# Patient Record
Sex: MALE | Race: WHITE | NOT HISPANIC OR LATINO | Employment: OTHER | URBAN - NONMETROPOLITAN AREA
[De-identification: names, ages, dates, MRNs, and addresses within clinical notes are randomized per-mention and may not be internally consistent; named-entity substitution may affect disease eponyms.]

---

## 2021-03-12 ENCOUNTER — IMMUNIZATIONS (OUTPATIENT)
Dept: FAMILY MEDICINE CLINIC | Facility: HOSPITAL | Age: 71
End: 2021-03-12

## 2021-03-12 DIAGNOSIS — Z23 ENCOUNTER FOR IMMUNIZATION: Primary | ICD-10-CM

## 2021-03-12 PROCEDURE — 91300 SARS-COV-2 / COVID-19 MRNA VACCINE (PFIZER-BIONTECH) 30 MCG: CPT

## 2021-03-12 PROCEDURE — 0001A SARS-COV-2 / COVID-19 MRNA VACCINE (PFIZER-BIONTECH) 30 MCG: CPT

## 2021-04-08 ENCOUNTER — IMMUNIZATIONS (OUTPATIENT)
Dept: FAMILY MEDICINE CLINIC | Facility: HOSPITAL | Age: 71
End: 2021-04-08

## 2021-04-08 DIAGNOSIS — Z23 ENCOUNTER FOR IMMUNIZATION: Primary | ICD-10-CM

## 2021-04-08 PROCEDURE — 0002A SARS-COV-2 / COVID-19 MRNA VACCINE (PFIZER-BIONTECH) 30 MCG: CPT

## 2021-04-08 PROCEDURE — 91300 SARS-COV-2 / COVID-19 MRNA VACCINE (PFIZER-BIONTECH) 30 MCG: CPT

## 2021-04-20 DIAGNOSIS — M79.10 MYALGIA, UNSPECIFIED SITE: ICD-10-CM

## 2021-05-12 ENCOUNTER — APPOINTMENT (OUTPATIENT)
Dept: NUCLEAR MEDICINE | Facility: HOSPITAL | Age: 71
DRG: 305 | End: 2021-05-12
Payer: MEDICARE

## 2021-05-12 ENCOUNTER — APPOINTMENT (EMERGENCY)
Dept: RADIOLOGY | Facility: HOSPITAL | Age: 71
DRG: 305 | End: 2021-05-12
Payer: MEDICARE

## 2021-05-12 ENCOUNTER — HOSPITAL ENCOUNTER (INPATIENT)
Facility: HOSPITAL | Age: 71
LOS: 1 days | Discharge: HOME | DRG: 305 | End: 2021-05-13
Attending: EMERGENCY MEDICINE | Admitting: FAMILY MEDICINE
Payer: MEDICARE

## 2021-05-12 DIAGNOSIS — I24.9 ACS (ACUTE CORONARY SYNDROME) (HCC): Primary | ICD-10-CM

## 2021-05-12 DIAGNOSIS — I16.9 HYPERTENSIVE CRISIS: ICD-10-CM

## 2021-05-12 DIAGNOSIS — I10 UNCONTROLLED HYPERTENSION: ICD-10-CM

## 2021-05-12 DIAGNOSIS — R07.9 CHEST PAIN WITH MODERATE RISK FOR CARDIAC ETIOLOGY: ICD-10-CM

## 2021-05-12 PROBLEM — E78.2 MIXED HYPERLIPIDEMIA: Status: ACTIVE | Noted: 2021-05-12

## 2021-05-12 PROBLEM — E11.9 TYPE 2 DIABETES MELLITUS WITHOUT COMPLICATION, WITHOUT LONG-TERM CURRENT USE OF INSULIN (HCC): Status: ACTIVE | Noted: 2021-05-12

## 2021-05-12 PROBLEM — K21.00 GASTROESOPHAGEAL REFLUX DISEASE WITH ESOPHAGITIS: Status: ACTIVE | Noted: 2021-05-12

## 2021-05-12 LAB
ALBUMIN SERPL BCP-MCNC: 4.2 G/DL (ref 3.5–5)
ALP SERPL-CCNC: 60 U/L (ref 46–116)
ALT SERPL W P-5'-P-CCNC: 31 U/L (ref 12–78)
ANION GAP SERPL CALCULATED.3IONS-SCNC: 7 MMOL/L (ref 4–13)
APTT PPP: 30 SECONDS (ref 23–37)
AST SERPL W P-5'-P-CCNC: 20 U/L (ref 5–45)
BASOPHILS # BLD AUTO: 0.05 THOUSANDS/ΜL (ref 0–0.1)
BASOPHILS NFR BLD AUTO: 1 % (ref 0–1)
BILIRUB SERPL-MCNC: 0.82 MG/DL (ref 0.2–1)
BUN SERPL-MCNC: 11 MG/DL (ref 5–25)
CALCIUM SERPL-MCNC: 9.8 MG/DL (ref 8.3–10.1)
CHLORIDE SERPL-SCNC: 102 MMOL/L (ref 100–108)
CHOLEST SERPL-MCNC: 171 MG/DL (ref 50–200)
CO2 SERPL-SCNC: 32 MMOL/L (ref 21–32)
CREAT SERPL-MCNC: 0.9 MG/DL (ref 0.6–1.3)
EOSINOPHIL # BLD AUTO: 0.16 THOUSAND/ΜL (ref 0–0.61)
EOSINOPHIL NFR BLD AUTO: 2 % (ref 0–6)
ERYTHROCYTE [DISTWIDTH] IN BLOOD BY AUTOMATED COUNT: 11.9 % (ref 11.6–15.1)
GFR SERPL CREATININE-BSD FRML MDRD: 87 ML/MIN/1.73SQ M
GLUCOSE SERPL-MCNC: 105 MG/DL (ref 65–140)
GLUCOSE SERPL-MCNC: 121 MG/DL (ref 65–140)
GLUCOSE SERPL-MCNC: 96 MG/DL (ref 65–140)
HCT VFR BLD AUTO: 44.6 % (ref 36.5–49.3)
HCV AB SER QL: NORMAL
HDLC SERPL-MCNC: 49 MG/DL
HGB BLD-MCNC: 14.8 G/DL (ref 12–17)
IMM GRANULOCYTES # BLD AUTO: 0.02 THOUSAND/UL (ref 0–0.2)
IMM GRANULOCYTES NFR BLD AUTO: 0 % (ref 0–2)
INR PPP: 1.02 (ref 0.84–1.19)
LACTATE SERPL-SCNC: 1.2 MMOL/L (ref 0.5–2)
LDLC SERPL CALC-MCNC: 110 MG/DL (ref 0–100)
LIPASE SERPL-CCNC: 221 U/L (ref 73–393)
LYMPHOCYTES # BLD AUTO: 2.76 THOUSANDS/ΜL (ref 0.6–4.47)
LYMPHOCYTES NFR BLD AUTO: 36 % (ref 14–44)
MAGNESIUM SERPL-MCNC: 1.9 MG/DL (ref 1.6–2.6)
MCH RBC QN AUTO: 30.9 PG (ref 26.8–34.3)
MCHC RBC AUTO-ENTMCNC: 33.2 G/DL (ref 31.4–37.4)
MCV RBC AUTO: 93 FL (ref 82–98)
MONOCYTES # BLD AUTO: 0.74 THOUSAND/ΜL (ref 0.17–1.22)
MONOCYTES NFR BLD AUTO: 10 % (ref 4–12)
NEUTROPHILS # BLD AUTO: 3.9 THOUSANDS/ΜL (ref 1.85–7.62)
NEUTS SEG NFR BLD AUTO: 51 % (ref 43–75)
NRBC BLD AUTO-RTO: 0 /100 WBCS
PLATELET # BLD AUTO: 183 THOUSANDS/UL (ref 149–390)
PMV BLD AUTO: 11.6 FL (ref 8.9–12.7)
POTASSIUM SERPL-SCNC: 4.5 MMOL/L (ref 3.5–5.3)
PROT SERPL-MCNC: 7.9 G/DL (ref 6.4–8.2)
PROTHROMBIN TIME: 13.2 SECONDS (ref 11.6–14.5)
RBC # BLD AUTO: 4.79 MILLION/UL (ref 3.88–5.62)
SODIUM SERPL-SCNC: 141 MMOL/L (ref 136–145)
TRIGL SERPL-MCNC: 62 MG/DL
TROPONIN I SERPL-MCNC: <0.02 NG/ML
TSH SERPL DL<=0.05 MIU/L-ACNC: 1.98 UIU/ML (ref 0.36–3.74)
WBC # BLD AUTO: 7.63 THOUSAND/UL (ref 4.31–10.16)

## 2021-05-12 PROCEDURE — 85610 PROTHROMBIN TIME: CPT | Performed by: EMERGENCY MEDICINE

## 2021-05-12 PROCEDURE — 84484 ASSAY OF TROPONIN QUANT: CPT | Performed by: NURSE PRACTITIONER

## 2021-05-12 PROCEDURE — 80053 COMPREHEN METABOLIC PANEL: CPT | Performed by: EMERGENCY MEDICINE

## 2021-05-12 PROCEDURE — 36415 COLL VENOUS BLD VENIPUNCTURE: CPT | Performed by: EMERGENCY MEDICINE

## 2021-05-12 PROCEDURE — 86803 HEPATITIS C AB TEST: CPT | Performed by: NURSE PRACTITIONER

## 2021-05-12 PROCEDURE — 99220 PR INITIAL OBSERVATION CARE/DAY 70 MINUTES: CPT | Performed by: FAMILY MEDICINE

## 2021-05-12 PROCEDURE — 80061 LIPID PANEL: CPT | Performed by: NURSE PRACTITIONER

## 2021-05-12 PROCEDURE — 83605 ASSAY OF LACTIC ACID: CPT | Performed by: EMERGENCY MEDICINE

## 2021-05-12 PROCEDURE — 82948 REAGENT STRIP/BLOOD GLUCOSE: CPT

## 2021-05-12 PROCEDURE — 84484 ASSAY OF TROPONIN QUANT: CPT | Performed by: EMERGENCY MEDICINE

## 2021-05-12 PROCEDURE — 93005 ELECTROCARDIOGRAM TRACING: CPT

## 2021-05-12 PROCEDURE — 99222 1ST HOSP IP/OBS MODERATE 55: CPT | Performed by: INTERNAL MEDICINE

## 2021-05-12 PROCEDURE — 83735 ASSAY OF MAGNESIUM: CPT | Performed by: NURSE PRACTITIONER

## 2021-05-12 PROCEDURE — 83690 ASSAY OF LIPASE: CPT | Performed by: EMERGENCY MEDICINE

## 2021-05-12 PROCEDURE — 84443 ASSAY THYROID STIM HORMONE: CPT | Performed by: NURSE PRACTITIONER

## 2021-05-12 PROCEDURE — 99285 EMERGENCY DEPT VISIT HI MDM: CPT | Performed by: EMERGENCY MEDICINE

## 2021-05-12 PROCEDURE — 85730 THROMBOPLASTIN TIME PARTIAL: CPT | Performed by: EMERGENCY MEDICINE

## 2021-05-12 PROCEDURE — 85025 COMPLETE CBC W/AUTO DIFF WBC: CPT | Performed by: EMERGENCY MEDICINE

## 2021-05-12 PROCEDURE — 71045 X-RAY EXAM CHEST 1 VIEW: CPT

## 2021-05-12 PROCEDURE — 1124F ACP DISCUSS-NO DSCNMKR DOCD: CPT | Performed by: EMERGENCY MEDICINE

## 2021-05-12 PROCEDURE — 99285 EMERGENCY DEPT VISIT HI MDM: CPT

## 2021-05-12 RX ORDER — LISINOPRIL 20 MG/1
40 TABLET ORAL DAILY
Status: DISCONTINUED | OUTPATIENT
Start: 2021-05-12 | End: 2021-05-13 | Stop reason: HOSPADM

## 2021-05-12 RX ORDER — ACETAMINOPHEN 325 MG/1
650 TABLET ORAL ONCE
Status: COMPLETED | OUTPATIENT
Start: 2021-05-12 | End: 2021-05-12

## 2021-05-12 RX ORDER — AMLODIPINE BESYLATE 10 MG/1
10 TABLET ORAL DAILY
Status: DISCONTINUED | OUTPATIENT
Start: 2021-05-12 | End: 2021-05-13 | Stop reason: HOSPADM

## 2021-05-12 RX ORDER — METFORMIN HYDROCHLORIDE 500 MG/1
500 TABLET, EXTENDED RELEASE ORAL EVERY EVENING
Status: DISCONTINUED | OUTPATIENT
Start: 2021-05-12 | End: 2021-05-12

## 2021-05-12 RX ORDER — ACETAMINOPHEN 160 MG
2000 TABLET,DISINTEGRATING ORAL
COMMUNITY
Start: 2021-04-21

## 2021-05-12 RX ORDER — METFORMIN HYDROCHLORIDE 500 MG/1
500 TABLET, EXTENDED RELEASE ORAL EVERY EVENING
COMMUNITY
Start: 2021-03-07

## 2021-05-12 RX ORDER — CARVEDILOL 12.5 MG/1
12.5 TABLET ORAL 2 TIMES DAILY WITH MEALS
Status: DISCONTINUED | OUTPATIENT
Start: 2021-05-12 | End: 2021-05-13 | Stop reason: HOSPADM

## 2021-05-12 RX ORDER — ASPIRIN 81 MG/1
81 TABLET ORAL DAILY
Status: DISCONTINUED | OUTPATIENT
Start: 2021-05-13 | End: 2021-05-13 | Stop reason: HOSPADM

## 2021-05-12 RX ORDER — METOPROLOL SUCCINATE 50 MG/1
100 TABLET, EXTENDED RELEASE ORAL
Status: DISCONTINUED | OUTPATIENT
Start: 2021-05-12 | End: 2021-05-12

## 2021-05-12 RX ORDER — ASPIRIN 81 MG/1
81 TABLET, CHEWABLE ORAL DAILY
Status: DISCONTINUED | OUTPATIENT
Start: 2021-05-13 | End: 2021-05-12

## 2021-05-12 RX ORDER — LISINOPRIL 10 MG/1
20 TABLET ORAL DAILY
Status: DISCONTINUED | OUTPATIENT
Start: 2021-05-12 | End: 2021-05-12

## 2021-05-12 RX ORDER — PANTOPRAZOLE SODIUM 40 MG/1
40 TABLET, DELAYED RELEASE ORAL
Status: DISCONTINUED | OUTPATIENT
Start: 2021-05-12 | End: 2021-05-13 | Stop reason: HOSPADM

## 2021-05-12 RX ORDER — METOPROLOL SUCCINATE 100 MG/1
100 TABLET, EXTENDED RELEASE ORAL
COMMUNITY
Start: 2021-03-31 | End: 2021-05-13 | Stop reason: HOSPADM

## 2021-05-12 RX ORDER — ASPIRIN 81 MG/1
324 TABLET, CHEWABLE ORAL ONCE
Status: COMPLETED | OUTPATIENT
Start: 2021-05-12 | End: 2021-05-12

## 2021-05-12 RX ORDER — MELATONIN
2000 DAILY
Status: DISCONTINUED | OUTPATIENT
Start: 2021-05-12 | End: 2021-05-13 | Stop reason: HOSPADM

## 2021-05-12 RX ORDER — AMLODIPINE BESYLATE 5 MG/1
7.5 TABLET ORAL DAILY
COMMUNITY
Start: 2021-05-11 | End: 2021-05-13 | Stop reason: HOSPADM

## 2021-05-12 RX ORDER — LISINOPRIL 20 MG/1
20 TABLET ORAL DAILY
COMMUNITY
Start: 2021-03-31 | End: 2021-05-13 | Stop reason: HOSPADM

## 2021-05-12 RX ORDER — FLUTICASONE PROPIONATE 50 MCG
2 SPRAY, SUSPENSION (ML) NASAL DAILY
COMMUNITY

## 2021-05-12 RX ORDER — OMEPRAZOLE 20 MG/1
40 CAPSULE, DELAYED RELEASE ORAL DAILY
COMMUNITY
Start: 2021-03-17

## 2021-05-12 RX ORDER — ATORVASTATIN CALCIUM 40 MG/1
40 TABLET, FILM COATED ORAL
Status: DISCONTINUED | OUTPATIENT
Start: 2021-05-12 | End: 2021-05-13 | Stop reason: HOSPADM

## 2021-05-12 RX ORDER — ROSUVASTATIN CALCIUM 20 MG/1
20 TABLET, COATED ORAL EVERY OTHER DAY
COMMUNITY
Start: 2021-02-22 | End: 2021-07-16 | Stop reason: SINTOL

## 2021-05-12 RX ADMIN — ASPIRIN 81 MG CHEWABLE TABLET 324 MG: 81 TABLET CHEWABLE at 02:52

## 2021-05-12 RX ADMIN — Medication 2000 UNITS: at 08:41

## 2021-05-12 RX ADMIN — ACETAMINOPHEN 650 MG: 325 TABLET ORAL at 10:39

## 2021-05-12 RX ADMIN — CARVEDILOL 12.5 MG: 12.5 TABLET, FILM COATED ORAL at 17:53

## 2021-05-12 RX ADMIN — PANTOPRAZOLE SODIUM 40 MG: 40 TABLET, DELAYED RELEASE ORAL at 06:10

## 2021-05-12 RX ADMIN — ENOXAPARIN SODIUM 40 MG: 40 INJECTION SUBCUTANEOUS at 21:38

## 2021-05-12 RX ADMIN — AMLODIPINE BESYLATE 10 MG: 10 TABLET ORAL at 08:40

## 2021-05-12 RX ADMIN — ATORVASTATIN CALCIUM 40 MG: 40 TABLET, FILM COATED ORAL at 17:52

## 2021-05-12 RX ADMIN — LISINOPRIL 40 MG: 20 TABLET ORAL at 08:41

## 2021-05-12 NOTE — ASSESSMENT & PLAN NOTE
Lab Results   Component Value Date    HGBA1C 7 6 (H) 02/22/2021       No results for input(s): POCGLU in the last 72 hours      Blood Sugar Average: Last 72 hrs:     Hemoglobin A1c 7 6 in February  Outpatient follow-up  Continue metformin  Trend Accu-Cheks, sliding scale insulin coverage

## 2021-05-12 NOTE — ASSESSMENT & PLAN NOTE
· EGD on Monday revealed changes consistent with Wylie's esophagus  · Omeprazole 40 mg daily outpatient regimen  · Continue PPI- Outpatient follow-up

## 2021-05-12 NOTE — ASSESSMENT & PLAN NOTE
· POA with blood pressure 189/81  · C/O feeling hot with elevated blood pressure  · PCP increased amlodipine from 5 mg to 7 5 mg on 05/11  · Continue home regimen amlodipine 7 5 mg, lisinopril 20 mg, metoprolol succinate 100 mg daily  · Trend blood pressures

## 2021-05-12 NOTE — ASSESSMENT & PLAN NOTE
Negative troponin levels with no EKG changes  Suspect HTN etiology, will optimize medications for BP control  Plan for nuclear stress test tomorrow  Will obtain echocardiogram

## 2021-05-12 NOTE — ASSESSMENT & PLAN NOTE
Stop Metoprolol succinate  Start Carvedilol 12 5mg BID  Will obtain renal artery ultrasound and echocardiogram  Plan for nuclear stress test tomorrow - NO caffeine

## 2021-05-12 NOTE — H&P
[facility] 1950, 73 y o  male MRN: [id number]  Unit/Bed#: -01 Encounter: [id number]  Primary Care Provider: Sg Saeed MD   Date and time admitted to hospital: 5/12/2021  2:35 AM    * Chest pain with moderate risk for cardiac etiology  Assessment & Plan  · POA- awakened at 2:00 a m  With left arm pain radiating into left anterior shoulder  · ELLIOTT 1  · Risk factors-dm, HTN, hyperlipidemia  · History abnormal stress test 20 years ago with subsequent normal cardiac catheterization at [facility]  · Aspirin 324 mg given in ER  · Pain-free at time of admission  · Initial troponin and EKG nonischemic  · Plan- trend troponins, EKG, obtain stress test  · Appreciate cardiology consultation    Gastroesophageal reflux disease with esophagitis  Assessment & Plan  · EGD on Monday revealed changes consistent with Wylie's esophagus  · Omeprazole 40 mg daily outpatient regimen  · Continue PPI- Outpatient follow-up    Mixed hyperlipidemia  Assessment & Plan  · Check fasting lipid panel  · Continue high-intensity statin    Type 2 diabetes mellitus without complication, without long-term current use of insulin (HCC)  Assessment & Plan  Lab Results   Component Value Date    HGBA1C 7 6 (H) 02/22/2021       No results for input(s): POCGLU in the last 72 hours      Blood Sugar Average: Last 72 hrs:     Hemoglobin A1c 7 6 in February  Outpatient follow-up  Continue metformin  Trend Accu-Cheks, sliding scale insulin coverage    Hypertensive crisis  Assessment & Plan  · POA with blood pressure 189/81  · C/O feeling hot with elevated blood pressure  · PCP increased amlodipine from 5 mg to 7 5 mg on 05/11  · Continue home regimen amlodipine 7 5 mg, lisinopril 20 mg, metoprolol succinate 100 mg daily  · Trend blood pressures    VTE Prophylaxis: Enoxaparin (Lovenox)  / sequential compression device   Code Status:  Full  POLST: POLST form is not discussed and not completed at this time  Discussion with family:  Wife    Anticipated Length of Stay:  Patient will be admitted on an Observation basis with an anticipated length of stay of  < 2 midnights  Justification for Hospital Stay:  Arm pain, flushing    Total Time for Visit, including Counseling / Coordination of Care: 30 minutes  Greater than 50% of this total time spent on direct patient counseling and coordination of care  Chief Complaint:   Arm pain    History of Present Illness:    Ismael Sullivan is a 73 y o  male who presents with history of diabetes mellitus, hypertension with labile blood pressures and recent blood pressure medication regimen changes, hyperlipidemia, history of abnormal stress test 20 years ago with subsequent normal cardiac catheterization  Patient reports that he awakened at 2:00 a m  With severe left arm pain radiating into his left anterior shoulder, earlier in the day he reports hypertension and his PCP increased his amlodipine dosage  He reports a fairly active day where he mowed his grass with a walk behind lawn more and moved stone from his patio  He denied chest pain or dyspnea with any of these events  Recent upper endoscopy revealed Wylie's esophagus changes and his omeprazole was increased  Concern in emergency department for cardiac cause of left arm pain with significant risk factors plan for stress test     Review of Systems:    Review of Systems   Constitutional: Positive for diaphoresis  Negative for chills and fever  HENT: Negative for ear pain and sore throat  Eyes: Negative for pain and visual disturbance  Respiratory: Negative for cough and shortness of breath  Cardiovascular: Negative for chest pain and palpitations  Gastrointestinal: Negative for abdominal pain and vomiting  Genitourinary: Negative for dysuria and hematuria  Musculoskeletal: Positive for arthralgias  Negative for back pain  Skin: Negative for color change and rash     Neurological: Negative for seizures and syncope  All other systems reviewed and are negative  Past Medical and Surgical History:     Past Medical History:   Diagnosis Date    Diabetes mellitus ([facility] [address] )     High cholesterol     Hypertension        History reviewed  No pertinent surgical history  Meds/Allergies:    Prior to Admission medications    Medication Sig Start Date End Date Taking? Authorizing Provider   amLODIPine (NORVASC) 5 mg tablet Take 7 5 mg by mouth daily 5/11/21  Yes Historical Provider, MD   Cholecalciferol (Vitamin D3) 50 MCG (2000 UT) capsule Take 2,000 Units by mouth daily after lunch 4/21/21  Yes Historical Provider, MD   fluticasone (FLONASE) 50 mcg/act nasal spray 2 sprays into each nostril daily   Yes Historical Provider, MD   lisinopril (ZESTRIL) 20 mg tablet Take 20 mg by mouth daily 3/31/21  Yes Historical Provider, MD   metFORMIN (GLUCOPHAGE-XR) 500 mg 24 hr tablet Take 500 mg by mouth every evening 3/7/21  Yes Historical Provider, MD   metoprolol succinate (TOPROL-XL) 100 mg 24 hr tablet Take 100 mg by mouth daily at bedtime 3/31/21  Yes Historical Provider, MD   omeprazole (PriLOSEC) 20 mg delayed release capsule Take 40 mg by mouth daily 3/17/21  Yes Historical Provider, MD   rosuvastatin (CRESTOR) 20 MG tablet Take 20 mg by mouth every evening 2/22/21  Yes Historical Provider, MD     I have reviewed home medications with patient personally      Allergies: No Known Allergies    Social History:     Marital Status: /Civil Union   Occupation:  Retired from [employer]  Patient Pre-hospital Living Situation:  Independent  Patient Pre-hospital Level of Mobility:  Independent  Patient Pre-hospital Diet Restrictions:  Heart healthy  Substance Use History:   Social History     Substance and Sexual Activity   Alcohol Use Not Currently     Social History     Tobacco Use   Smoking Status Never Smoker   Smokeless Tobacco Never Used     Social History     Substance and Sexual Activity Drug Use Never       Family History:  Unaware of cardiac disease in first-degree family members    Physical Exam:     Vitals:   Blood Pressure: (!) 179/86 (05/12/21 0408)  Pulse: 68 (05/12/21 0408)  Temperature: 98 °F (36 7 °C) (05/12/21 0408)  Temp Source: Temporal (05/12/21 0240)  Respirations: 20 (05/12/21 0408)  Height: 5' 9" (175 3 cm) (05/12/21 0408)  Weight - Scale: 122 kg (268 lb 1 3 oz) (05/12/21 0408)  SpO2: 98 % (05/12/21 0408)    Physical Exam  Vitals signs and nursing note reviewed  Constitutional:       Appearance: Normal appearance  He is normal weight  HENT:      Head: Normocephalic and atraumatic  Eyes:      Conjunctiva/sclera: Conjunctivae normal    Neck:      Musculoskeletal: Normal range of motion and neck supple  Cardiovascular:      Rate and Rhythm: Normal rate and regular rhythm  Pulses: Normal pulses  Heart sounds: Normal heart sounds  Pulmonary:      Effort: Pulmonary effort is normal       Breath sounds: Normal breath sounds  Abdominal:      General: Abdomen is flat  Palpations: Abdomen is soft  Tenderness: There is no abdominal tenderness  Musculoskeletal: Normal range of motion  Skin:     General: Skin is warm and dry  Capillary Refill: Capillary refill takes less than 2 seconds  Neurological:      General: No focal deficit present  Mental Status: He is alert and oriented to person, place, and time  Cranial Nerves: No cranial nerve deficit  Sensory: No sensory deficit  Motor: No weakness  Coordination: Coordination normal       Gait: Gait normal    Psychiatric:         Mood and Affect: Mood is anxious  Behavior: Behavior normal        Additional Data:     Lab Results: I have personally reviewed pertinent reports        Results from last 7 days   Lab Units 05/12/21  0251   WBC Thousand/uL 7 63   HEMOGLOBIN g/dL 14 8   HEMATOCRIT % 44 6   PLATELETS Thousands/uL 183   NEUTROS PCT % 51   LYMPHS PCT % 36   MONOS PCT % 10   EOS PCT % 2     Results from last 7 days   Lab Units 05/12/21  0251   SODIUM mmol/L 141   POTASSIUM mmol/L 4 5   CHLORIDE mmol/L 102   CO2 mmol/L 32   BUN mg/dL 11   CREATININE mg/dL 0 90   ANION GAP mmol/L 7   CALCIUM mg/dL 9 8   ALBUMIN g/dL 4 2   TOTAL BILIRUBIN mg/dL 0 82   ALK PHOS U/L 60   ALT U/L 31   AST U/L 20   GLUCOSE RANDOM mg/dL 121     Results from last 7 days   Lab Units 05/12/21  0251   INR  1 02             Results from last 7 days   Lab Units 05/12/21  0251   LACTIC ACID mmol/L 1 2       Imaging: I have personally reviewed pertinent films in PACS    XR chest 1 view portable   ED Interpretation by Jana Ureña MD (05/12 0308)   NAD          EKG, Pathology, and Other Studies Reviewed on Admission:   · EKG:  Sinus rhythm, no ectopy, normal ST segments    Allscripts / Epic Records Reviewed: Yes     ** Please Note: This note has been constructed using a voice recognition system   **

## 2021-05-12 NOTE — CONSULTS
Consultation - Cardiology   Lady Marie 73 y o  male MRN: [id number]  Unit/Bed#: -01 Encounter: [id number]    Consults      Physician Requesting Consult: Antione Krishnan MD  Reason for Consult / Principal Problem: chest pain    History of Present Illness   HPI: Lady Marie is a 73y o  year old male who has a history of HTN and diabetes mellitus type 2  He presents with left shoulder pain with hot burning sensation going down the left arm  He reports an ongoing history of uncontrolled hypertension with BP elevations to 180's/100's starting back in March of this year  His blood pressure medications were adjusted several times by his PCP with no improvement  He reports episodes of "flushing" sensation in his neck and head with "beet red face and ears" usually occur in the afternoon  He does check his blood pressure during these episodes and his BP will be elevated in 180's/100's  Sugars have been very well controlled  In January of this year he modified his diet and drastically reduced sodium and carbohydrates  He has lost 20 pounds with this change  His sugars are much better controlled however his BP has not improved  He denies any episodes of chest pain and shortness of breath  He was to see his PCP yesterday and his BP medication was adjusted  He called his PCP office at 2pm when he experienced another flushing episode associated with a strong urge to urinate  He states he voided a large amount  They advised going to the ER but he declined  He continued about his day but woke at 2am with a sharp stabbing pain in the top of his left shoulder and a burning pain in his left arm  No chest pain or shortness of breath  This prompted him to come to the emergency room  At this time he denies complaint  No chest pain, shortness of breath, arm pain, or headache  No leg swelling  He admits that he does snore but has never been tested for sleep apnea    He tells me he did have an echocardiogram about 20 years ago that showed an "enlarged heart"  He also had a stress test at that time that showed "shadowing" and he underwent a cardiac cath that was clear  He denies h/o renal artery imaging  He did undergo an EGD on Monday of this week due to ongoing acid reflux, which revealed Wylie's esophagus changes  He does have some indigestion today after taking his pills this morning on an empty stomach  He unfortunately did have green tea this morning and therefore stress test is being postponed until tomorrow  Review of Systems   Constitution: Negative  HENT: Negative  Cardiovascular: Negative  Negative for chest pain, dyspnea on exertion, irregular heartbeat, near-syncope, orthopnea and palpitations  Respiratory: Negative for cough and snoring  Endocrine: Negative  Skin: Negative  Musculoskeletal: Positive for back pain  Left shoulder and arm pain   Gastrointestinal: Negative  Genitourinary: Negative  Neurological: Negative  Psychiatric/Behavioral: Negative  Historical Information   Past Medical History:   Diagnosis Date    Diabetes mellitus ([facility] [address] )     High cholesterol     Hypertension      History reviewed  No pertinent surgical history  Social History     Substance and Sexual Activity   Alcohol Use Not Currently     Social History     Substance and Sexual Activity   Drug Use Never     Social History     Tobacco Use   Smoking Status Never Smoker   Smokeless Tobacco Never Used     Family History:   Family History   Problem Relation Age of Onset    Clotting disorder Mother     Alcohol abuse Father     Hypertension Father     Coronary artery disease Sister     Hypertension Sister     Hypertension Brother     Diabetes Paternal Grandmother        Meds/Allergies   all current active meds have been reviewed       No Known Allergies    Objective   Vitals: Blood pressure (!) 176/84, pulse 74, temperature 97 8 °F (36 6 °C), resp   rate 20, height 5' 9" (1 753 m), weight 122 kg (268 lb 1 3 oz), SpO2 95 %  , Body mass index is 39 59 kg/m² ,   Orthostatic Blood Pressures      Most Recent Value   Blood Pressure  (!) 176/84 filed at 05/12/2021 1122   Patient Position - Orthostatic VS  Lying filed at 05/12/2021 0074        Systolic (82MXO), DYX:913 , Min:153 , JPV:316     Diastolic (12YLY), OXR:24, Min:63, Max:86      Intake/Output Summary (Last 24 hours) at 5/12/2021 1300  Last data filed at 5/12/2021 0854  Gross per 24 hour   Intake 240 ml   Output --   Net 240 ml       Weight (last 2 days)     Date/Time   Weight    05/12/21 04:08:59   122 (268 08)    05/12/21 0240   122 (269 18)              Invasive Devices     Peripheral Intravenous Line            Peripheral IV 05/12/21 Left Antecubital less than 1 day                  Physical Exam  Vitals signs and nursing note reviewed  Constitutional:       General: He is not in acute distress  Appearance: Normal appearance  He is obese  He is not ill-appearing, toxic-appearing or diaphoretic  HENT:      Head: Normocephalic and atraumatic  Neck:      Musculoskeletal: Normal range of motion  Vascular: No carotid bruit or JVD  Cardiovascular:      Rate and Rhythm: Normal rate and regular rhythm  No extrasystoles are present  Pulses: Normal pulses  Radial pulses are 2+ on the right side and 2+ on the left side  Dorsalis pedis pulses are 2+ on the right side and 2+ on the left side  Heart sounds: Normal heart sounds, S1 normal and S2 normal  No murmur  Pulmonary:      Effort: Pulmonary effort is normal       Breath sounds: Normal breath sounds  Abdominal:      General: Abdomen is flat  There is no distension  Palpations: Abdomen is soft  Tenderness: There is no abdominal tenderness  Musculoskeletal: Normal range of motion  Right lower leg: No edema  Left lower leg: No edema  Skin:     General: Skin is warm and dry        Capillary Refill: Capillary refill takes less than 2 seconds  Neurological:      General: No focal deficit present  Mental Status: He is alert and oriented to person, place, and time  Psychiatric:         Mood and Affect: Mood normal          Speech: Speech normal          Behavior: Behavior normal  Behavior is cooperative  Cognition and Memory: Cognition and memory normal              Laboratory Results:  Results from last 7 days   Lab Units 05/12/21  0902 05/12/21  0612 05/12/21  0251   TROPONIN I ng/mL <0 02 <0 02 <0 02       CBC with diff:   Results from last 7 days   Lab Units 05/12/21  0251   WBC Thousand/uL 7 63   HEMOGLOBIN g/dL 14 8   HEMATOCRIT % 44 6   MCV fL 93   PLATELETS Thousands/uL 183   MCH pg 30 9   MCHC g/dL 33 2   RDW % 11 9   MPV fL 11 6   NRBC AUTO /100 WBCs 0         CMP:  Results from last 7 days   Lab Units 05/12/21  0251   POTASSIUM mmol/L 4 5   CHLORIDE mmol/L 102   CO2 mmol/L 32   BUN mg/dL 11   CREATININE mg/dL 0 90   CALCIUM mg/dL 9 8   AST U/L 20   ALT U/L 31   ALK PHOS U/L 60   EGFR ml/min/1 73sq m 87         BMP:  Results from last 7 days   Lab Units 05/12/21  0251   POTASSIUM mmol/L 4 5   CHLORIDE mmol/L 102   CO2 mmol/L 32   BUN mg/dL 11   CREATININE mg/dL 0 90   CALCIUM mg/dL 9 8       BNP:  No results for input(s): BNP in the last 72 hours  Magnesium:   Results from last 7 days   Lab Units 05/12/21  0251   MAGNESIUM mg/dL 1 9       Coags:   Results from last 7 days   Lab Units 05/12/21  0251   PTT seconds 30   INR  1 02       TSH:   1 97    Hemoglobin A1C    7 6 (from 2/22/21)    Lipid Profile:   Results from last 7 days   Lab Units 05/12/21  0612   TRIGLYCERIDES mg/dL 62   HDL mg/dL 49     Lab Results   Component Value Date    LDLCALC 110 (H) 05/12/2021       Imaging: I have personally reviewed pertinent reports  Xr Chest 1 View Portable    Result Date: 5/12/2021  Narrative: CHEST INDICATION:   Chest pain   COMPARISON:  None EXAM PERFORMED/VIEWS:  XR CHEST PORTABLE FINDINGS: Cardiomediastinal silhouette appears unremarkable  The lungs are clear  No pneumothorax or pleural effusion  Osseous structures appear within normal limits for patient age  Impression: No acute cardiopulmonary disease  Workstation performed: [id number]       EKG reviewed personally: normal sinus rhythm  Telemetry reviewed personally: normal sinus rhythm, rate 60's    Assessment:  Principal Problem:    Chest pain with moderate risk for cardiac etiology  Active Problems:    Hypertensive crisis    Type 2 diabetes mellitus without complication, without long-term current use of insulin (HCC)    Mixed hyperlipidemia    Gastroesophageal reflux disease with esophagitis      Assesment/ Plan: Will adjust medications to optimize blood pressure - discontinue Metoprolol succinate and transition to Carvedilol 12 5mg BID  Discontinue chewable aspirin and switch to ASA EC 81mg for reduced GI irritation  Will obtain renal artery ultrasound along with transthoracic echocardiogram   Plan for nuclear stress test tomorrow - NO caffeine  Counseling / Coordination of Care  Total floor / unit time spent today 45 minutes  Greater than 50% of total time was spent with the patient and / or family counseling and / or coordination of care  A description of the counseling / coordination of care: spoke with Dr Michael Romano  Will optimize blood pressure and plan for stress test tomorrow          Code Status: Level 1 - Full Code

## 2021-05-12 NOTE — ED PROVIDER NOTES
History  Chief Complaint   Patient presents with    Chest Pain     Patient began having some chest tightness  Patient also has some pain in left shoulder radiating down left arm  Patient was awakened this morning within the last 1/2 hour with burning in his left shoulder  States the brain went down his left arm and developed tightness in his chest   Got slightly nauseated  No shortness of breath  No diaphoresis  No history of similar episodes  Patient does have diabetes  Patient is of high cholesterol  Patient has hypertension  States his blood pressures been running high sick lately  The symptoms seem to resolved on its own  Currently has no chest tightness  No burning sensation  Had a headache earlier today but not now  No change in speech or vision  No focal weakness or numbness  History provided by:  Patient   used: No    Chest Pain  Pain location:  L chest  Pain quality: tightness    Pain radiates to:  L shoulder and L arm  Pain radiates to the back: no    Pain severity:  Mild  Onset quality:  Sudden  Duration:  15 minutes  Timing:  Constant  Progression:  Resolved  Chronicity:  New  Context: at rest    Context: no drug use, no movement and no stress    Relieved by:  Nothing  Worsened by:  Nothing tried  Ineffective treatments:  None tried  Associated symptoms: nausea    Associated symptoms: no abdominal pain, no altered mental status, no anorexia, no anxiety, no back pain, no cough, no dizziness, no dysphagia, no fatigue, no fever, no headache, no lower extremity edema, no numbness, no palpitations, no PND, no shortness of breath and not vomiting    Risk factors: diabetes mellitus, high cholesterol, hypertension and obesity    Risk factors: no smoking        None       Past Medical History:   Diagnosis Date    Diabetes mellitus ([facility] [address] )     High cholesterol     Hypertension        History reviewed  No pertinent surgical history  History reviewed   No pertinent family history  I have reviewed and agree with the history as documented  E-Cigarette/Vaping    E-Cigarette Use Never User      E-Cigarette/Vaping Substances     Social History     Tobacco Use    Smoking status: Never Smoker    Smokeless tobacco: Never Used   Substance Use Topics    Alcohol use: Not Currently    Drug use: Never       Review of Systems   Constitutional: Negative for chills, fatigue and fever  HENT: Negative for ear pain, hearing loss, sore throat, trouble swallowing and voice change  Eyes: Negative for pain and discharge  Respiratory: Negative for cough, shortness of breath and wheezing  Cardiovascular: Positive for chest pain  Negative for palpitations and PND  Gastrointestinal: Positive for nausea  Negative for abdominal pain, anorexia, blood in stool, constipation, diarrhea and vomiting  Genitourinary: Negative for dysuria, flank pain, frequency and hematuria  Musculoskeletal: Negative for back pain, joint swelling, neck pain and neck stiffness  Skin: Negative for rash and wound  Neurological: Negative for dizziness, seizures, syncope, facial asymmetry, numbness and headaches  Psychiatric/Behavioral: Negative for hallucinations, self-injury and suicidal ideas  All other systems reviewed and are negative  Physical Exam  Physical Exam  Vitals signs and nursing note reviewed  Constitutional:       General: He is not in acute distress  Appearance: He is well-developed  HENT:      Head: Normocephalic and atraumatic  Right Ear: External ear normal       Left Ear: External ear normal    Eyes:      Conjunctiva/sclera: Conjunctivae normal       Pupils: Pupils are equal, round, and reactive to light  Neck:      Musculoskeletal: Normal range of motion and neck supple  Cardiovascular:      Rate and Rhythm: Normal rate and regular rhythm  Heart sounds: Normal heart sounds  No murmur     Pulmonary:      Effort: Pulmonary effort is normal       Breath sounds: Normal breath sounds  No wheezing or rales  Abdominal:      General: Bowel sounds are normal  There is no distension  Palpations: Abdomen is soft  Tenderness: There is no abdominal tenderness  There is no guarding or rebound  Musculoskeletal: Normal range of motion  General: No deformity  Skin:     General: Skin is warm and dry  Findings: No rash  Neurological:      General: No focal deficit present  Mental Status: He is alert and oriented to person, place, and time  Cranial Nerves: No cranial nerve deficit  Psychiatric:         Behavior: Behavior normal          Vital Signs  ED Triage Vitals [05/12/21 0240]   Temperature Pulse Respirations Blood Pressure SpO2   98 °F (36 7 °C) 83 18 (!) 189/81 95 %      Temp Source Heart Rate Source Patient Position - Orthostatic VS BP Location FiO2 (%)   Temporal Monitor Lying Right arm --      Pain Score       2           Vitals:    05/12/21 0240 05/12/21 0300   BP: (!) 189/81 153/63   Pulse: 83 68   Patient Position - Orthostatic VS: Lying          Visual Acuity      ED Medications  Medications   nitroglycerin (NITRO-BID) 2 % TD ointment 0 5 inch (0 5 inches Topical Not Given 5/12/21 0253)   aspirin chewable tablet 324 mg (324 mg Oral Given 5/12/21 0252)       Diagnostic Studies  Results Reviewed     Procedure Component Value Units Date/Time    Lactic acid [[id number]]  (Normal) Collected: 05/12/21 0251    Lab Status: Final result Specimen: Blood from Arm, Left Updated: 05/12/21 0320     LACTIC ACID 1 2 mmol/L     Narrative:      Result may be elevated if tourniquet was used during collection      Troponin I [[id number]]  (Normal) Collected: 05/12/21 0251    Lab Status: Final result Specimen: Blood from Arm, Left Updated: 05/12/21 0317     Troponin I <0 02 ng/mL     Comprehensive metabolic panel [[id number]] Collected: 05/12/21 0251    Lab Status: Final result Specimen: Blood from Arm, Left Updated: 05/12/21 0315     Sodium 141 mmol/L      Potassium 4 5 mmol/L      Chloride 102 mmol/L      CO2 32 mmol/L      ANION GAP 7 mmol/L      BUN 11 mg/dL      Creatinine 0 90 mg/dL      Glucose 121 mg/dL      Calcium 9 8 mg/dL      AST 20 U/L      ALT 31 U/L      Alkaline Phosphatase 60 U/L      Total Protein 7 9 g/dL      Albumin 4 2 g/dL      Total Bilirubin 0 82 mg/dL      eGFR 87 ml/min/1 73sq m     Narrative:      National Kidney Disease Foundation guidelines for Chronic Kidney Disease (CKD):     Stage 1 with normal or high GFR (GFR > 90 mL/min/1 73 square meters)    Stage 2 Mild CKD (GFR = 60-89 mL/min/1 73 square meters)    Stage 3A Moderate CKD (GFR = 45-59 mL/min/1 73 square meters)    Stage 3B Moderate CKD (GFR = 30-44 mL/min/1 73 square meters)    Stage 4 Severe CKD (GFR = 15-29 mL/min/1 73 square meters)    Stage 5 End Stage CKD (GFR <15 mL/min/1 73 square meters)  Note: GFR calculation is accurate only with a steady state creatinine    Lipase [[id number]]  (Normal) Collected: 05/12/21 0251    Lab Status: Final result Specimen: Blood from Arm, Left Updated: 05/12/21 0315     Lipase 221 u/L     Protime-INR [[id number]]  (Normal) Collected: 05/12/21 0251    Lab Status: Final result Specimen: Blood from Arm, Left Updated: 05/12/21 0312     Protime 13 2 seconds      INR 1 02    APTT [[id number]]  (Normal) Collected: 05/12/21 0251    Lab Status: Final result Specimen: Blood from Arm, Left Updated: 05/12/21 0312     PTT 30 seconds     CBC and differential [[id number]] Collected: 05/12/21 0251    Lab Status: Final result Specimen: Blood from Arm, Left Updated: 05/12/21 0259     WBC 7 63 Thousand/uL      RBC 4 79 Million/uL      Hemoglobin 14 8 g/dL      Hematocrit 44 6 %      MCV 93 fL      MCH 30 9 pg      MCHC 33 2 g/dL      RDW 11 9 %      MPV 11 6 fL      Platelets 261 Thousands/uL      nRBC 0 /100 WBCs      Neutrophils Relative 51 %      Immat GRANS % 0 %      Lymphocytes Relative 36 %      Monocytes Relative 10 %      Eosinophils Relative 2 %      Basophils Relative 1 %      Neutrophils Absolute 3 90 Thousands/µL      Immature Grans Absolute 0 02 Thousand/uL      Lymphocytes Absolute 2 76 Thousands/µL      Monocytes Absolute 0 74 Thousand/µL      Eosinophils Absolute 0 16 Thousand/µL      Basophils Absolute 0 05 Thousands/µL                  XR chest 1 view portable   ED Interpretation by Ria Harris MD (05/12 0308)   NAD                 Procedures  ECG 12 Lead Documentation Only    Date/Time: 5/12/2021 2:46 AM  Performed by: Ria Harris MD  Authorized by: Ria Harris MD     ECG reviewed by me, the ED Provider: yes    Patient location:  ED  Previous ECG:     Previous ECG:  Unavailable  Rate:     ECG rate:  70  Rhythm:     Rhythm: sinus rhythm    Ectopy:     Ectopy: none    QRS:     QRS axis:  Normal             ED Course             HEART Risk Score      Most Recent Value   Heart Score Risk Calculator   History  2 Filed at: 05/12/2021 0247   ECG  0 Filed at: 05/12/2021 0247   Age  2 Filed at: 05/12/2021 0247   Risk Factors  2 Filed at: 05/12/2021 0247   Troponin  0 Filed at: 05/12/2021 0247   HEART Score  6 Filed at: 05/12/2021 0247                      SBIRT 22yo+      Most Recent Value   SBIRT (25 yo +)   In order to provide better care to our patients, we are screening all of our patients for alcohol and drug use  Would it be okay to ask you these screening questions? Yes Filed at: 05/12/2021 0243   Initial Alcohol Screen: US AUDIT-C    1  How often do you have a drink containing alcohol?  0 Filed at: 05/12/2021 0243   2  How many drinks containing alcohol do you have on a typical day you are drinking? 0 Filed at: 05/12/2021 0243   3a  Male UNDER 65: How often do you have five or more drinks on one occasion? 0 Filed at: 05/12/2021 0243   3b  FEMALE Any Age, or MALE 65+: How often do you have 4 or more drinks on one occassion?   0 Filed at: 05/12/2021 0243   Audit-C Score  0 Filed at: 05/12/2021 0243   CARMELA: How many times in the past year have you    Used an illegal drug or used a prescription medication for non-medical reasons? Never Filed at: 05/12/2021 0243                    MDM  Number of Diagnoses or Management Options     Amount and/or Complexity of Data Reviewed  Clinical lab tests: reviewed  Review and summarize past medical records: yes        Disposition  Final diagnoses:   ACS (acute coronary syndrome) ([facility] [address] )     Time reflects when diagnosis was documented in both MDM as applicable and the Disposition within this note     Time User Action Codes Description Comment    5/12/2021  3:00 AM Clarisse Sher Add [I24 9] ACS (acute coronary syndrome) [facility])       ED Disposition     ED Disposition Condition Date/Time Comment    Admit Stable Wed May 12, 2021  3:22 AM Case was discussed with Basilia Dent and the patient's admission status was agreed to be  to the service of Dr Cecilie Khan Christean Boxer    None         Patient's Medications    No medications on file     No discharge procedures on file      PDMP Review     None          ED Provider  Electronically Signed by           Pramod Alexander MD  05/12/21 7929

## 2021-05-12 NOTE — ASSESSMENT & PLAN NOTE
· POA- awakened at 2:00 a m   With left arm pain radiating into left anterior shoulder  · ELLIOTT 1  · Risk factors-dm, HTN, hyperlipidemia  · History abnormal stress test 20 years ago with subsequent normal cardiac catheterization at [facility]  · Aspirin 324 mg given in ER  · Pain-free at time of admission  · Initial troponin and EKG nonischemic  · Plan- trend troponins, EKG, obtain stress test  · Appreciate cardiology consultation

## 2021-05-12 NOTE — PLAN OF CARE
Problem: Potential for Falls  Goal: Patient will remain free of falls  Description: INTERVENTIONS:  - Assess patient frequently for physical needs  -  Identify cognitive and physical deficits and behaviors that affect risk of falls    -  [place] fall precautions as indicated by assessment   - Educate patient/family on patient safety including physical limitations  - Instruct patient to call for assistance with activity based on assessment  - Modify environment to reduce risk of injury  - Consider OT/PT consult to assist with strengthening/mobility  Outcome: Progressing     Problem: PAIN - ADULT  Goal: Verbalizes/displays adequate comfort level or baseline comfort level  Description: Interventions:  - Encourage patient to monitor pain and request assistance  - Assess pain using appropriate pain scale  - Administer analgesics based on type and severity of pain and evaluate response  - Implement non-pharmacological measures as appropriate and evaluate response  - Consider cultural and social influences on pain and pain management  - Notify physician/advanced practitioner if interventions unsuccessful or patient reports new pain  Outcome: Progressing     Problem: INFECTION - ADULT  Goal: Absence or prevention of progression during hospitalization  Description: INTERVENTIONS:  - Assess and monitor for signs and symptoms of infection  - Monitor lab/diagnostic results  - Monitor all insertion sites, i e  indwelling lines, tubes, and drains  - Monitor endotracheal if appropriate and nasal secretions for changes in amount and color  - [place] appropriate cooling/warming therapies per order  - Administer medications as ordered  - Instruct and encourage patient and family to use good hand hygiene technique  - Identify and instruct in appropriate isolation precautions for identified infection/condition  Outcome: Progressing     Problem: SAFETY ADULT  Goal: Patient will remain free of falls  Description: INTERVENTIONS:  - Assess patient frequently for physical needs  -  Identify cognitive and physical deficits and behaviors that affect risk of falls    -  [place] fall precautions as indicated by assessment   - Educate patient/family on patient safety including physical limitations  - Instruct patient to call for assistance with activity based on assessment  - Modify environment to reduce risk of injury  - Consider OT/PT consult to assist with strengthening/mobility  Outcome: Progressing  Goal: Maintain or return to baseline ADL function  Description: INTERVENTIONS:  -  Assess patient's ability to carry out ADLs; assess patient's baseline for ADL function and identify physical deficits which impact ability to perform ADLs (bathing, care of mouth/teeth, toileting, grooming, dressing, etc )  - Assess/evaluate cause of self-care deficits   - Assess range of motion  - Assess patient's mobility; develop plan if impaired  - Assess patient's need for assistive devices and provide as appropriate  - Encourage maximum independence but intervene and supervise when necessary  - Involve family in performance of ADLs  - Assess for home care needs following discharge   - Consider OT consult to assist with ADL evaluation and planning for discharge  - Provide patient education as appropriate  Outcome: Progressing  Goal: Maintain or return mobility status to optimal level  Description: INTERVENTIONS:  - Assess patient's baseline mobility status (ambulation, transfers, stairs, etc )    - Identify cognitive and physical deficits and behaviors that affect mobility  - Identify mobility aids required to assist with transfers and/or ambulation (gait belt, sit-to-stand, lift, walker, cane, etc )  - [place] fall precautions as indicated by assessment  - Record patient progress and toleration of activity level on Mobility SBAR; progress patient to next Phase/Stage  - Instruct patient to call for assistance with activity based on assessment  - Consider rehabilitation consult to assist with strengthening/weightbearing, etc   Outcome: Progressing     Problem: DISCHARGE PLANNING  Goal: Discharge to home or other facility with appropriate resources  Description: INTERVENTIONS:  - Identify barriers to discharge w/patient and caregiver  - Arrange for needed discharge resources and transportation as appropriate  - Identify discharge learning needs (meds, wound care, etc )  - Arrange for interpretive services to assist at discharge as needed  - Refer to Case Management Department for coordinating discharge planning if the patient needs post-hospital services based on physician/advanced practitioner order or complex needs related to functional status, cognitive ability, or social support system  Outcome: Progressing     Problem: Knowledge Deficit  Goal: Patient/family/caregiver demonstrates understanding of disease process, treatment plan, medications, and discharge instructions  Description: Complete learning assessment and assess knowledge base    Interventions:  - Provide teaching at level of understanding  - Provide teaching via preferred learning methods  Outcome: Progressing     Problem: CARDIOVASCULAR - ADULT  Goal: Maintains optimal cardiac output and hemodynamic stability  Description: INTERVENTIONS:  - Monitor I/O, vital signs and rhythm  - Monitor for S/S and trends of decreased cardiac output  - Administer and titrate ordered vasoactive medications to optimize hemodynamic stability  - Assess quality of pulses, skin color and temperature  - Assess for signs of decreased coronary artery perfusion  - Instruct patient to report change in severity of symptoms  Outcome: Progressing  Goal: Absence of cardiac dysrhythmias or at baseline rhythm  Description: INTERVENTIONS:  - Continuous cardiac monitoring, vital signs, obtain 12 lead EKG if ordered  - Administer antiarrhythmic and heart rate control medications as ordered  - Monitor electrolytes and administer replacement therapy as ordered  Outcome: Progressing

## 2021-05-12 NOTE — PLAN OF CARE
Problem: Potential for Falls  Goal: Patient will remain free of falls  Description: INTERVENTIONS:  - Assess patient frequently for physical needs  -  Identify cognitive and physical deficits and behaviors that affect risk of falls    -  [place] fall precautions as indicated by assessment   - Educate patient/family on patient safety including physical limitations  - Instruct patient to call for assistance with activity based on assessment  - Modify environment to reduce risk of injury  - Consider OT/PT consult to assist with strengthening/mobility  Outcome: Progressing     Problem: PAIN - ADULT  Goal: Verbalizes/displays adequate comfort level or baseline comfort level  Description: Interventions:  - Encourage patient to monitor pain and request assistance  - Assess pain using appropriate pain scale  - Administer analgesics based on type and severity of pain and evaluate response  - Implement non-pharmacological measures as appropriate and evaluate response  - Consider cultural and social influences on pain and pain management  - Notify physician/advanced practitioner if interventions unsuccessful or patient reports new pain  Outcome: Progressing     Problem: INFECTION - ADULT  Goal: Absence or prevention of progression during hospitalization  Description: INTERVENTIONS:  - Assess and monitor for signs and symptoms of infection  - Monitor lab/diagnostic results  - Monitor all insertion sites, i e  indwelling lines, tubes, and drains  - Monitor endotracheal if appropriate and nasal secretions for changes in amount and color  - [place] appropriate cooling/warming therapies per order  - Administer medications as ordered  - Instruct and encourage patient and family to use good hand hygiene technique  - Identify and instruct in appropriate isolation precautions for identified infection/condition  Outcome: Progressing     Problem: SAFETY ADULT  Goal: Patient will remain free of falls  Description: INTERVENTIONS:  - Assess patient frequently for physical needs  -  Identify cognitive and physical deficits and behaviors that affect risk of falls    -  [place] fall precautions as indicated by assessment   - Educate patient/family on patient safety including physical limitations  - Instruct patient to call for assistance with activity based on assessment  - Modify environment to reduce risk of injury  - Consider OT/PT consult to assist with strengthening/mobility  Outcome: Progressing  Goal: Maintain or return to baseline ADL function  Description: INTERVENTIONS:  -  Assess patient's ability to carry out ADLs; assess patient's baseline for ADL function and identify physical deficits which impact ability to perform ADLs (bathing, care of mouth/teeth, toileting, grooming, dressing, etc )  - Assess/evaluate cause of self-care deficits   - Assess range of motion  - Assess patient's mobility; develop plan if impaired  - Assess patient's need for assistive devices and provide as appropriate  - Encourage maximum independence but intervene and supervise when necessary  - Involve family in performance of ADLs  - Assess for home care needs following discharge   - Consider OT consult to assist with ADL evaluation and planning for discharge  - Provide patient education as appropriate  Outcome: Progressing  Goal: Maintain or return mobility status to optimal level  Description: INTERVENTIONS:  - Assess patient's baseline mobility status (ambulation, transfers, stairs, etc )    - Identify cognitive and physical deficits and behaviors that affect mobility  - Identify mobility aids required to assist with transfers and/or ambulation (gait belt, sit-to-stand, lift, walker, cane, etc )  - [place] fall precautions as indicated by assessment  - Record patient progress and toleration of activity level on Mobility SBAR; progress patient to next Phase/Stage  - Instruct patient to call for assistance with activity based on assessment  - Consider rehabilitation consult to assist with strengthening/weightbearing, etc   Outcome: Progressing     Problem: DISCHARGE PLANNING  Goal: Discharge to home or other facility with appropriate resources  Description: INTERVENTIONS:  - Identify barriers to discharge w/patient and caregiver  - Arrange for needed discharge resources and transportation as appropriate  - Identify discharge learning needs (meds, wound care, etc )  - Arrange for interpretive services to assist at discharge as needed  - Refer to Case Management Department for coordinating discharge planning if the patient needs post-hospital services based on physician/advanced practitioner order or complex needs related to functional status, cognitive ability, or social support system  Outcome: Progressing     Problem: Knowledge Deficit  Goal: Patient/family/caregiver demonstrates understanding of disease process, treatment plan, medications, and discharge instructions  Description: Complete learning assessment and assess knowledge base    Interventions:  - Provide teaching at level of understanding  - Provide teaching via preferred learning methods  Outcome: Progressing     Problem: CARDIOVASCULAR - ADULT  Goal: Maintains optimal cardiac output and hemodynamic stability  Description: INTERVENTIONS:  - Monitor I/O, vital signs and rhythm  - Monitor for S/S and trends of decreased cardiac output  - Administer and titrate ordered vasoactive medications to optimize hemodynamic stability  - Assess quality of pulses, skin color and temperature  - Assess for signs of decreased coronary artery perfusion  - Instruct patient to report change in severity of symptoms  Outcome: Progressing  Goal: Absence of cardiac dysrhythmias or at baseline rhythm  Description: INTERVENTIONS:  - Continuous cardiac monitoring, vital signs, obtain 12 lead EKG if ordered  - Administer antiarrhythmic and heart rate control medications as ordered  - Monitor electrolytes and administer replacement therapy as ordered  Outcome: Progressing

## 2021-05-13 ENCOUNTER — APPOINTMENT (INPATIENT)
Dept: NON INVASIVE DIAGNOSTICS | Facility: HOSPITAL | Age: 71
DRG: 305 | End: 2021-05-13
Payer: MEDICARE

## 2021-05-13 ENCOUNTER — APPOINTMENT (OUTPATIENT)
Dept: NUCLEAR MEDICINE | Facility: HOSPITAL | Age: 71
DRG: 305 | End: 2021-05-13
Payer: MEDICARE

## 2021-05-13 ENCOUNTER — APPOINTMENT (INPATIENT)
Dept: NUCLEAR MEDICINE | Facility: HOSPITAL | Age: 71
DRG: 305 | End: 2021-05-13
Payer: MEDICARE

## 2021-05-13 ENCOUNTER — TELEPHONE (OUTPATIENT)
Dept: CARDIOLOGY CLINIC | Facility: CLINIC | Age: 71
End: 2021-05-13

## 2021-05-13 VITALS
BODY MASS INDEX: 39.71 KG/M2 | HEIGHT: 69 IN | DIASTOLIC BLOOD PRESSURE: 82 MMHG | HEART RATE: 93 BPM | OXYGEN SATURATION: 97 % | RESPIRATION RATE: 20 BRPM | TEMPERATURE: 97.8 F | SYSTOLIC BLOOD PRESSURE: 152 MMHG | WEIGHT: 268.08 LBS

## 2021-05-13 LAB
GLUCOSE SERPL-MCNC: 105 MG/DL (ref 65–140)
GLUCOSE SERPL-MCNC: 131 MG/DL (ref 65–140)

## 2021-05-13 PROCEDURE — G1004 CDSM NDSC: HCPCS

## 2021-05-13 PROCEDURE — A9502 TC99M TETROFOSMIN: HCPCS

## 2021-05-13 PROCEDURE — 82948 REAGENT STRIP/BLOOD GLUCOSE: CPT

## 2021-05-13 PROCEDURE — C8929 TTE W OR WO FOL WCON,DOPPLER: HCPCS

## 2021-05-13 PROCEDURE — 93975 VASCULAR STUDY: CPT

## 2021-05-13 PROCEDURE — 93017 CV STRESS TEST TRACING ONLY: CPT

## 2021-05-13 PROCEDURE — 93306 TTE W/DOPPLER COMPLETE: CPT | Performed by: INTERNAL MEDICINE

## 2021-05-13 PROCEDURE — 99238 HOSP IP/OBS DSCHRG MGMT 30/<: CPT | Performed by: INTERNAL MEDICINE

## 2021-05-13 PROCEDURE — 93018 CV STRESS TEST I&R ONLY: CPT | Performed by: INTERNAL MEDICINE

## 2021-05-13 PROCEDURE — 93016 CV STRESS TEST SUPVJ ONLY: CPT | Performed by: INTERNAL MEDICINE

## 2021-05-13 PROCEDURE — 78452 HT MUSCLE IMAGE SPECT MULT: CPT

## 2021-05-13 PROCEDURE — 78452 HT MUSCLE IMAGE SPECT MULT: CPT | Performed by: INTERNAL MEDICINE

## 2021-05-13 RX ORDER — ASPIRIN 81 MG/1
81 TABLET ORAL DAILY
Refills: 0
Start: 2021-05-14

## 2021-05-13 RX ORDER — CARVEDILOL 12.5 MG/1
12.5 TABLET ORAL 2 TIMES DAILY WITH MEALS
Qty: 60 TABLET | Refills: 0 | Status: SHIPPED | OUTPATIENT
Start: 2021-05-13 | End: 2021-07-16 | Stop reason: SDUPTHER

## 2021-05-13 RX ORDER — AMLODIPINE BESYLATE 10 MG/1
10 TABLET ORAL DAILY
Qty: 30 TABLET | Refills: 0 | Status: SHIPPED | OUTPATIENT
Start: 2021-05-14 | End: 2021-05-19 | Stop reason: SDUPTHER

## 2021-05-13 RX ORDER — LISINOPRIL 40 MG/1
40 TABLET ORAL DAILY
Qty: 30 TABLET | Refills: 0 | Status: SHIPPED | OUTPATIENT
Start: 2021-05-14 | End: 2021-05-20 | Stop reason: SDUPTHER

## 2021-05-13 RX ADMIN — REGADENOSON 0.4 MG: 0.08 INJECTION, SOLUTION INTRAVENOUS at 09:45

## 2021-05-13 RX ADMIN — Medication 2000 UNITS: at 08:05

## 2021-05-13 RX ADMIN — PERFLUTREN 0.6 ML/MIN: 6.52 INJECTION, SUSPENSION INTRAVENOUS at 10:48

## 2021-05-13 RX ADMIN — LISINOPRIL 40 MG: 20 TABLET ORAL at 08:05

## 2021-05-13 RX ADMIN — ASPIRIN 81 MG: 81 TABLET, COATED ORAL at 08:05

## 2021-05-13 RX ADMIN — PANTOPRAZOLE SODIUM 40 MG: 40 TABLET, DELAYED RELEASE ORAL at 05:30

## 2021-05-13 RX ADMIN — CARVEDILOL 12.5 MG: 12.5 TABLET, FILM COATED ORAL at 14:21

## 2021-05-13 NOTE — ASSESSMENT & PLAN NOTE
Lab Results   Component Value Date    HGBA1C 7 6 (H) 02/22/2021       Recent Labs     05/12/21  1545 05/12/21  2047 05/13/21  0744 05/13/21  1113   POCGLU 105 105 105 131       Blood Sugar Average: Last 72 hrs:  (P) 551 [id number]   Hemoglobin A1c 7 6 in February  Restart outpatient medications on discharge

## 2021-05-13 NOTE — PLAN OF CARE
Problem: Potential for Falls  Goal: Patient will remain free of falls  Description: INTERVENTIONS:  - Assess patient frequently for physical needs  -  Identify cognitive and physical deficits and behaviors that affect risk of falls    -  [place] fall precautions as indicated by assessment   - Educate patient/family on patient safety including physical limitations  - Instruct patient to call for assistance with activity based on assessment  - Modify environment to reduce risk of injury  - Consider OT/PT consult to assist with strengthening/mobility  Outcome: Progressing     Problem: PAIN - ADULT  Goal: Verbalizes/displays adequate comfort level or baseline comfort level  Description: Interventions:  - Encourage patient to monitor pain and request assistance  - Assess pain using appropriate pain scale  - Administer analgesics based on type and severity of pain and evaluate response  - Implement non-pharmacological measures as appropriate and evaluate response  - Consider cultural and social influences on pain and pain management  - Notify physician/advanced practitioner if interventions unsuccessful or patient reports new pain  Outcome: Progressing     Problem: INFECTION - ADULT  Goal: Absence or prevention of progression during hospitalization  Description: INTERVENTIONS:  - Assess and monitor for signs and symptoms of infection  - Monitor lab/diagnostic results  - Monitor all insertion sites, i e  indwelling lines, tubes, and drains  - Monitor endotracheal if appropriate and nasal secretions for changes in amount and color  - [place] appropriate cooling/warming therapies per order  - Administer medications as ordered  - Instruct and encourage patient and family to use good hand hygiene technique  - Identify and instruct in appropriate isolation precautions for identified infection/condition  Outcome: Progressing     Problem: SAFETY ADULT  Goal: Patient will remain free of falls  Description: INTERVENTIONS:  - Assess patient frequently for physical needs  -  Identify cognitive and physical deficits and behaviors that affect risk of falls    -  [place] fall precautions as indicated by assessment   - Educate patient/family on patient safety including physical limitations  - Instruct patient to call for assistance with activity based on assessment  - Modify environment to reduce risk of injury  - Consider OT/PT consult to assist with strengthening/mobility  Outcome: Progressing  Goal: Maintain or return to baseline ADL function  Description: INTERVENTIONS:  -  Assess patient's ability to carry out ADLs; assess patient's baseline for ADL function and identify physical deficits which impact ability to perform ADLs (bathing, care of mouth/teeth, toileting, grooming, dressing, etc )  - Assess/evaluate cause of self-care deficits   - Assess range of motion  - Assess patient's mobility; develop plan if impaired  - Assess patient's need for assistive devices and provide as appropriate  - Encourage maximum independence but intervene and supervise when necessary  - Involve family in performance of ADLs  - Assess for home care needs following discharge   - Consider OT consult to assist with ADL evaluation and planning for discharge  - Provide patient education as appropriate  Outcome: Progressing  Goal: Maintain or return mobility status to optimal level  Description: INTERVENTIONS:  - Assess patient's baseline mobility status (ambulation, transfers, stairs, etc )    - Identify cognitive and physical deficits and behaviors that affect mobility  - Identify mobility aids required to assist with transfers and/or ambulation (gait belt, sit-to-stand, lift, walker, cane, etc )  - [place] fall precautions as indicated by assessment  - Record patient progress and toleration of activity level on Mobility SBAR; progress patient to next Phase/Stage  - Instruct patient to call for assistance with activity based on assessment  - Consider rehabilitation consult to assist with strengthening/weightbearing, etc   Outcome: Progressing     Problem: DISCHARGE PLANNING  Goal: Discharge to home or other facility with appropriate resources  Description: INTERVENTIONS:  - Identify barriers to discharge w/patient and caregiver  - Arrange for needed discharge resources and transportation as appropriate  - Identify discharge learning needs (meds, wound care, etc )  - Arrange for interpretive services to assist at discharge as needed  - Refer to Case Management Department for coordinating discharge planning if the patient needs post-hospital services based on physician/advanced practitioner order or complex needs related to functional status, cognitive ability, or social support system  Outcome: Progressing     Problem: Knowledge Deficit  Goal: Patient/family/caregiver demonstrates understanding of disease process, treatment plan, medications, and discharge instructions  Description: Complete learning assessment and assess knowledge base    Interventions:  - Provide teaching at level of understanding  - Provide teaching via preferred learning methods  Outcome: Progressing     Problem: CARDIOVASCULAR - ADULT  Goal: Maintains optimal cardiac output and hemodynamic stability  Description: INTERVENTIONS:  - Monitor I/O, vital signs and rhythm  - Monitor for S/S and trends of decreased cardiac output  - Administer and titrate ordered vasoactive medications to optimize hemodynamic stability  - Assess quality of pulses, skin color and temperature  - Assess for signs of decreased coronary artery perfusion  - Instruct patient to report change in severity of symptoms  Outcome: Progressing  Goal: Absence of cardiac dysrhythmias or at baseline rhythm  Description: INTERVENTIONS:  - Continuous cardiac monitoring, vital signs, obtain 12 lead EKG if ordered  - Administer antiarrhythmic and heart rate control medications as ordered  - Monitor electrolytes and administer replacement therapy as ordered  Outcome: Progressing

## 2021-05-13 NOTE — ASSESSMENT & PLAN NOTE
· Medication change in patient length next discharged home on amlodipine 10 mg daily lisinopril 40 mg daily and Coreg 12 5 mg b i d   · Outpatient follow-up PCP in 1 week

## 2021-05-13 NOTE — PLAN OF CARE
Problem: Potential for Falls  Goal: Patient will remain free of falls  Description: INTERVENTIONS:  - Assess patient frequently for physical needs  -  Identify cognitive and physical deficits and behaviors that affect risk of falls    -  [place] fall precautions as indicated by assessment   - Educate patient/family on patient safety including physical limitations  - Instruct patient to call for assistance with activity based on assessment  - Modify environment to reduce risk of injury  - Consider OT/PT consult to assist with strengthening/mobility  Outcome: Progressing     Problem: PAIN - ADULT  Goal: Verbalizes/displays adequate comfort level or baseline comfort level  Description: Interventions:  - Encourage patient to monitor pain and request assistance  - Assess pain using appropriate pain scale  - Administer analgesics based on type and severity of pain and evaluate response  - Implement non-pharmacological measures as appropriate and evaluate response  - Consider cultural and social influences on pain and pain management  - Notify physician/advanced practitioner if interventions unsuccessful or patient reports new pain  Outcome: Progressing     Problem: INFECTION - ADULT  Goal: Absence or prevention of progression during hospitalization  Description: INTERVENTIONS:  - Assess and monitor for signs and symptoms of infection  - Monitor lab/diagnostic results  - Monitor all insertion sites, i e  indwelling lines, tubes, and drains  - Monitor endotracheal if appropriate and nasal secretions for changes in amount and color  - [place] appropriate cooling/warming therapies per order  - Administer medications as ordered  - Instruct and encourage patient and family to use good hand hygiene technique  - Identify and instruct in appropriate isolation precautions for identified infection/condition  Outcome: Progressing     Problem: SAFETY ADULT  Goal: Patient will remain free of falls  Description: INTERVENTIONS:  - Assess patient frequently for physical needs  -  Identify cognitive and physical deficits and behaviors that affect risk of falls    -  [place] fall precautions as indicated by assessment   - Educate patient/family on patient safety including physical limitations  - Instruct patient to call for assistance with activity based on assessment  - Modify environment to reduce risk of injury  - Consider OT/PT consult to assist with strengthening/mobility  Outcome: Progressing  Goal: Maintain or return to baseline ADL function  Description: INTERVENTIONS:  -  Assess patient's ability to carry out ADLs; assess patient's baseline for ADL function and identify physical deficits which impact ability to perform ADLs (bathing, care of mouth/teeth, toileting, grooming, dressing, etc )  - Assess/evaluate cause of self-care deficits   - Assess range of motion  - Assess patient's mobility; develop plan if impaired  - Assess patient's need for assistive devices and provide as appropriate  - Encourage maximum independence but intervene and supervise when necessary  - Involve family in performance of ADLs  - Assess for home care needs following discharge   - Consider OT consult to assist with ADL evaluation and planning for discharge  - Provide patient education as appropriate  Outcome: Progressing  Goal: Maintain or return mobility status to optimal level  Description: INTERVENTIONS:  - Assess patient's baseline mobility status (ambulation, transfers, stairs, etc )    - Identify cognitive and physical deficits and behaviors that affect mobility  - Identify mobility aids required to assist with transfers and/or ambulation (gait belt, sit-to-stand, lift, walker, cane, etc )  - [place] fall precautions as indicated by assessment  - Record patient progress and toleration of activity level on Mobility SBAR; progress patient to next Phase/Stage  - Instruct patient to call for assistance with activity based on assessment  - Consider rehabilitation consult to assist with strengthening/weightbearing, etc   Outcome: Progressing     Problem: DISCHARGE PLANNING  Goal: Discharge to home or other facility with appropriate resources  Description: INTERVENTIONS:  - Identify barriers to discharge w/patient and caregiver  - Arrange for needed discharge resources and transportation as appropriate  - Identify discharge learning needs (meds, wound care, etc )  - Arrange for interpretive services to assist at discharge as needed  - Refer to Case Management Department for coordinating discharge planning if the patient needs post-hospital services based on physician/advanced practitioner order or complex needs related to functional status, cognitive ability, or social support system  Outcome: Progressing     Problem: Knowledge Deficit  Goal: Patient/family/caregiver demonstrates understanding of disease process, treatment plan, medications, and discharge instructions  Description: Complete learning assessment and assess knowledge base    Interventions:  - Provide teaching at level of understanding  - Provide teaching via preferred learning methods  Outcome: Progressing     Problem: CARDIOVASCULAR - ADULT  Goal: Maintains optimal cardiac output and hemodynamic stability  Description: INTERVENTIONS:  - Monitor I/O, vital signs and rhythm  - Monitor for S/S and trends of decreased cardiac output  - Administer and titrate ordered vasoactive medications to optimize hemodynamic stability  - Assess quality of pulses, skin color and temperature  - Assess for signs of decreased coronary artery perfusion  - Instruct patient to report change in severity of symptoms  Outcome: Progressing  Goal: Absence of cardiac dysrhythmias or at baseline rhythm  Description: INTERVENTIONS:  - Continuous cardiac monitoring, vital signs, obtain 12 lead EKG if ordered  - Administer antiarrhythmic and heart rate control medications as ordered  - Monitor electrolytes and administer replacement therapy as ordered  Outcome: Progressing

## 2021-05-13 NOTE — DISCHARGE SUMMARY
[address]  Discharge summary - Jodi Began 1950, 73 y o  male MRN: [id number]  Unit/Bed#: -01 Encounter: [id number]  Primary Care Provider: Sofia Da Silva MD   Date and time admitted to hospital: 5/12/2021  2:35 AM     * Chest pain with moderate risk for cardiac etiology  Assessment & Plan  · Stress test negative  · Discharge home with outpatient follow-up     Hypertensive crisis  Assessment & Plan  · Medication change in patient length next discharged home on amlodipine 10 mg daily lisinopril 40 mg daily and Coreg 12 5 mg b i d   · Outpatient follow-up PCP in 1 week     Type 2 diabetes mellitus without complication, without long-term current use of insulin [facility])  Assessment & Plan        Lab Results   Component Value Date     HGBA1C 7 6 (H) 02/22/2021                Recent Labs     05/12/21  1545 05/12/21  2047 05/13/21  0744 05/13/21  1113   POCGLU 105 105 105 131         Blood Sugar Average: Last 72 hrs:  (P) 220 [id number]   Hemoglobin A1c 7 6 in February  Restart outpatient medications on discharge     Gastroesophageal reflux disease with esophagitis  Assessment & Plan  · EGD on Monday revealed changes consistent with Wylie's esophagus  · Omeprazole 40 mg daily outpatient regimen  · Continue PPI- Outpatient follow-up     Mixed hyperlipidemia  Assessment & Plan  · Continue outpatient Crestor             Resolved Problems  Date Reviewed: 5/13/2021     None          Discharging Physician / Practitioner: Kristen Godfrey MD  PCP: Sofia Da Silva MD  Admission Date:       Admission Orders (From admission, onward)              Ordered          05/12/21 1206   Inpatient Admission  Once           05/12/21 0323   Place in Observation  Once                       Discharge Date: 05/13/21     Consultations During Hospital Stay:  · None     Procedures Performed:   · Clindamycin stress test no evidence of ischemic changes  · Echocardiogram ejection fraction 55% with grade 1 diastolic dysfunction  · Renal artery duplex no evidence of renal artery stenosis bilaterally     Significant Findings / Test Results:   · None     Incidental Findings:   · None      Test Results Pending at Discharge (will require follow up): · None     Outpatient Tests Requested:  · None     Complications:  None     Reason for Admission:  Chest pain     Hospital Course:   Catherine Saleh is a 73 y o  male patient who originally presented to the hospital on 5/12/2021 due to elevated blood pressure  Patient reports he is having difficulty controlling his blood pressure home  Will complete pneumonia with an elevated blood pressure greater than 180 with some flushing and head pain  As well as chest tightness and left arm burning  Came to the hospital was admitted to rule out acute coronary syndrome which was negative was noted to be hypertensive on admission  Medications were changed after consultation with Cardiology  Underwent the above testing which is negative to be discharged home with improved blood pressure and 150s  Patient advised follow-up with PCP next week for blood pressure management        Please see above list of diagnoses and related plan for additional information       Condition at Discharge: good     Discharge Day Visit / Exam:   Subjective:  Review of systems negative  Vitals: Blood Pressure: 152/82 (05/13/21 1230)  Pulse: 90 (05/13/21 1230)  Temperature: 97 8 °F (36 6 °C) (05/13/21 1110)  Temp Source: Oral (05/12/21 2223)  Respirations: 20 (05/13/21 1110)  Height: 5' 9" (175 3 cm) (05/12/21 0408)  Weight - Scale: 122 kg (268 lb 1 3 oz) (05/12/21 0408)  SpO2: 97 % (05/13/21 1230)  Exam:   Physical Exam  Constitutional:       General: He is not in acute distress  Appearance: He is not diaphoretic  Cardiovascular:      Rate and Rhythm: Normal rate and regular rhythm  Heart sounds: No murmur  No friction rub  No gallop      Pulmonary:      Effort: No respiratory distress  Breath sounds: Normal breath sounds  No wheezing, rhonchi or rales  Abdominal:      General: There is no distension  Tenderness: There is no abdominal tenderness  There is no guarding or rebound  Skin:     General: Skin is warm and dry  Neurological:      Mental Status: He is oriented to person, place, and time           Discussion with Family: Patient declined call to        Discharge instructions/Information to patient and family:   See after visit summary for information provided to patient and family        Provisions for Follow-Up Care:  See after visit summary for information related to follow-up care and any pertinent home health orders  Disposition:   Home     Planned Readmission: no     Discharge Statement:  I spent 30 minutes discharging the patient  This time was spent on the day of discharge  I had direct contact with the patient on the day of discharge  Greater than 50% of the total time was spent examining patient, answering all patient questions, arranging and discussing plan of care with patient as well as directly providing post-discharge instructions  Additional time then spent on discharge activities  Please note patient was admitted for suspicion of acute coronary syndrome requiring aggressive care and hypertensive urgency he meeting dramatic and unexpected improvement in his symptoms and care    Patient be discharged sooner than 2 midnights despite all medical probability he would require greater than 2 midnight stay on admission     Discharge Medications:  See after visit summary for reconciled discharge medications provided to patient and/or family        **Please Note: This note may have been constructed using a voice recognition system**

## 2021-05-13 NOTE — PROGRESS NOTES
[address]  Progress Note - Verner Lute 1950, 73 y o  male MRN: [id number]  Unit/Bed#: -[facility] Encounter: [id number]  Primary Care Provider: Dolores Faustin MD   Date and time admitted to hospital: 5/12/2021  2:35 AM    * Chest pain with moderate risk for cardiac etiology  Assessment & Plan  · Stress test negative  · Discharge home with outpatient follow-up    Hypertensive crisis  Assessment & Plan  · Medication change in patient length next discharged home on amlodipine 10 mg daily lisinopril 40 mg daily and Coreg 12 5 mg b i d   · Outpatient follow-up PCP in 1 week    Type 2 diabetes mellitus without complication, without long-term current use of insulin [facility])  Assessment & Plan  Lab Results   Component Value Date    HGBA1C 7 6 (H) 02/22/2021       Recent Labs     05/12/21  1545 05/12/21  2047 05/13/21  0744 05/13/21  1113   POCGLU 105 105 105 131       Blood Sugar Average: Last 72 hrs:  (P) 252 [id number]   Hemoglobin A1c 7 6 in February  Restart outpatient medications on discharge    Gastroesophageal reflux disease with esophagitis  Assessment & Plan  · EGD on Monday revealed changes consistent with Wylie's esophagus  · Omeprazole 40 mg daily outpatient regimen  · Continue PPI- Outpatient follow-up    Mixed hyperlipidemia  Assessment & Plan  · Continue outpatient Crestor      Resolved Problems  Date Reviewed: 5/13/2021    None        Discharging Physician / Practitioner: Lauren Flores MD  PCP: Dolores Faustin MD  Admission Date:   Admission Orders (From admission, onward)     Ordered        05/12/21 1206  Inpatient Admission  Once         05/12/21 0323  Place in Observation  Once                   Discharge Date: 05/13/21    Consultations During Hospital Stay:  · None    Procedures Performed:   · Clindamycin stress test no evidence of ischemic changes  · Echocardiogram ejection fraction 55% with grade 1 diastolic dysfunction  · Renal artery duplex no evidence of renal artery stenosis bilaterally    Significant Findings / Test Results:   · None    Incidental Findings:   · None     Test Results Pending at Discharge (will require follow up): · None     Outpatient Tests Requested:  · None    Complications:  None    Reason for Admission:  Chest pain    Hospital Course:   Olvin Santos is a 73 y o  male patient who originally presented to the hospital on 5/12/2021 due to elevated blood pressure  Patient reports he is having difficulty controlling his blood pressure home  Will complete pneumonia with an elevated blood pressure greater than 180 with some flushing and head pain  As well as chest tightness and left arm burning  Came to the hospital was admitted to rule out acute coronary syndrome which was negative was noted to be hypertensive on admission  Medications were changed after consultation with Cardiology  Underwent the above testing which is negative to be discharged home with improved blood pressure and 150s  Patient advised follow-up with PCP next week for blood pressure management      Please see above list of diagnoses and related plan for additional information  Condition at Discharge: good    Discharge Day Visit / Exam:   Subjective:  Review of systems negative  Vitals: Blood Pressure: 152/82 (05/13/21 1230)  Pulse: 90 (05/13/21 1230)  Temperature: 97 8 °F (36 6 °C) (05/13/21 1110)  Temp Source: Oral (05/12/21 2223)  Respirations: 20 (05/13/21 1110)  Height: 5' 9" (175 3 cm) (05/12/21 0408)  Weight - Scale: 122 kg (268 lb 1 3 oz) (05/12/21 0408)  SpO2: 97 % (05/13/21 1230)  Exam:   Physical Exam  Constitutional:       General: He is not in acute distress  Appearance: He is not diaphoretic  Cardiovascular:      Rate and Rhythm: Normal rate and regular rhythm  Heart sounds: No murmur  No friction rub  No gallop  Pulmonary:      Effort: No respiratory distress  Breath sounds: Normal breath sounds  No wheezing, rhonchi or rales  Abdominal:      General: There is no distension  Tenderness: There is no abdominal tenderness  There is no guarding or rebound  Skin:     General: Skin is warm and dry  Neurological:      Mental Status: He is oriented to person, place, and time  Discussion with Family: Patient declined call to   Discharge instructions/Information to patient and family:   See after visit summary for information provided to patient and family  Provisions for Follow-Up Care:  See after visit summary for information related to follow-up care and any pertinent home health orders  Disposition:   Home    Planned Readmission: no     Discharge Statement:  I spent 30 minutes discharging the patient  This time was spent on the day of discharge  I had direct contact with the patient on the day of discharge  Greater than 50% of the total time was spent examining patient, answering all patient questions, arranging and discussing plan of care with patient as well as directly providing post-discharge instructions  Additional time then spent on discharge activities  Discharge Medications:  See after visit summary for reconciled discharge medications provided to patient and/or family        **Please Note: This note may have been constructed using a voice recognition system**

## 2021-05-13 NOTE — NURSING NOTE
IV site d/c'd  AVS reviewed with pt at bedside  Medications to be picked up at pharmacy  F/u with PCP and cardiology  Verbalized understanding  No further questions or concerns

## 2021-05-14 PROCEDURE — 93975 VASCULAR STUDY: CPT | Performed by: SURGERY

## 2021-05-16 LAB
ATRIAL RATE: 63 BPM
ATRIAL RATE: 69 BPM
ATRIAL RATE: 75 BPM
P AXIS: 52 DEGREES
P AXIS: 56 DEGREES
P AXIS: 67 DEGREES
PR INTERVAL: 192 MS
PR INTERVAL: 194 MS
PR INTERVAL: 196 MS
QRS AXIS: 31 DEGREES
QRS AXIS: 31 DEGREES
QRS AXIS: 43 DEGREES
QRSD INTERVAL: 78 MS
QRSD INTERVAL: 80 MS
QRSD INTERVAL: 86 MS
QT INTERVAL: 400 MS
QT INTERVAL: 420 MS
QT INTERVAL: 434 MS
QTC INTERVAL: 444 MS
QTC INTERVAL: 446 MS
QTC INTERVAL: 450 MS
T WAVE AXIS: 1 DEGREES
T WAVE AXIS: 4 DEGREES
T WAVE AXIS: 41 DEGREES
VENTRICULAR RATE: 63 BPM
VENTRICULAR RATE: 69 BPM
VENTRICULAR RATE: 75 BPM

## 2021-05-16 PROCEDURE — 93010 ELECTROCARDIOGRAM REPORT: CPT | Performed by: INTERNAL MEDICINE

## 2021-05-17 LAB
ARRHY DURING EX: NORMAL
CHEST PAIN STATEMENT: NORMAL
MAX DIASTOLIC BP: 67 MMHG
MAX HEART RATE: 115 BPM
MAX PREDICTED HEART RATE: 151 BPM
MAX. SYSTOLIC BP: 156 MMHG
PROTOCOL NAME: NORMAL
REASON FOR TERMINATION: NORMAL
TARGET HR FORMULA: NORMAL
TEST INDICATION: NORMAL
TIME IN EXERCISE PHASE: NORMAL

## 2021-05-19 ENCOUNTER — TELEPHONE (OUTPATIENT)
Dept: CARDIOLOGY CLINIC | Facility: CLINIC | Age: 71
End: 2021-05-19

## 2021-05-19 ENCOUNTER — OFFICE VISIT (OUTPATIENT)
Dept: CARDIOLOGY CLINIC | Facility: CLINIC | Age: 71
End: 2021-05-19
Payer: MEDICARE

## 2021-05-19 VITALS
WEIGHT: 260 LBS | HEART RATE: 80 BPM | HEIGHT: 69 IN | SYSTOLIC BLOOD PRESSURE: 140 MMHG | BODY MASS INDEX: 38.51 KG/M2 | OXYGEN SATURATION: 96 % | DIASTOLIC BLOOD PRESSURE: 80 MMHG

## 2021-05-19 DIAGNOSIS — E78.2 MIXED HYPERLIPIDEMIA: ICD-10-CM

## 2021-05-19 DIAGNOSIS — I16.9 HYPERTENSIVE CRISIS: Primary | ICD-10-CM

## 2021-05-19 DIAGNOSIS — I10 ESSENTIAL HYPERTENSION: ICD-10-CM

## 2021-05-19 DIAGNOSIS — R07.9 CHEST PAIN WITH MODERATE RISK FOR CARDIAC ETIOLOGY: ICD-10-CM

## 2021-05-19 PROCEDURE — 99214 OFFICE O/P EST MOD 30 MIN: CPT | Performed by: NURSE PRACTITIONER

## 2021-05-19 RX ORDER — AMLODIPINE BESYLATE 5 MG/1
5 TABLET ORAL DAILY
Qty: 30 TABLET | Refills: 11 | Status: SHIPPED | OUTPATIENT
Start: 2021-05-19 | End: 2021-07-16 | Stop reason: SDUPTHER

## 2021-05-19 RX ORDER — SACCHAROMYCES BOULARDII 250 MG
250 CAPSULE ORAL DAILY
COMMUNITY
End: 2021-07-10

## 2021-05-19 RX ORDER — BUSPIRONE HYDROCHLORIDE 5 MG/1
5 TABLET ORAL DAILY
COMMUNITY

## 2021-05-19 NOTE — ASSESSMENT & PLAN NOTE
Lipids 5/13/21: , TG 62, HDL 49,   Newly started Rosuvastatin 20mg daily  Goal < 100  Statin managed by PCP

## 2021-05-19 NOTE — PHYSICIAN ADVISOR
Current patient class: Inpatient  The patient is currently on Hospital Day: 2 at / [facility]      The patient was admitted to the hospital  on 5/12/21 at 1206 for the following diagnosis:  Chest pain [R07 9]  ACS (acute coronary syndrome) ([facility] [address] ) [I24 9]  Chest pain with moderate risk for cardiac etiology [R07 9]     After review of the relevant documentation, labs, vital signs and test results, this is a PROVIDER LIABLE case  In this particular case the patient was admitted to the hospital as an inpatient  The patient however failed to satisfy the 2 midnight benchmark and closer scrutiny of the case was warranted  After review of the patient presentation and relevant labs the patient was most appropriate for observation or outpatient class at the time of admission  Given that this patient has already been discharged prior to this review they become a provider liable case  Rationale is as follows: The patient presented with severe left arm pain  And there was concern for a cardiac cause given significant risk factors  The admission note indicates plan for observation with less than two midnight stay  Blood pressure was elevated  He was eventually converted to an inpatient admission however remained in the hospital only for one midnight  He was ruled out for acute coronary syndrome and blood pressure responded to medication changes  Observation class was appropriate given the diagnosis and length of stay  The patients vitals on arrival were   ED Triage Vitals [05/12/21 0240]   Temperature Pulse Respirations Blood Pressure SpO2   98 °F (36 7 °C) 83 18 (!) 189/81 95 %      Temp Source Heart Rate Source Patient Position - Orthostatic VS BP Location FiO2 (%)   Temporal Monitor Lying Right arm --      Pain Score       2           Past Medical History:   Diagnosis Date    [facility]     High cholesterol     Hypertension      History reviewed   No pertinent surgical history  Consults have been placed to:   IP CONSULT TO CARDIOLOGY    Vitals:    05/13/21 1110 05/13/21 1119 05/13/21 1230 05/13/21 1421   BP: 161/97 151/82 152/82    BP Location:  Left arm     Pulse: 82 87 90 93   Resp: 20      Temp: 97 8 °F (36 6 °C)      TempSrc:       SpO2: 97% 96% 97%    Weight:       Height:           Most recent labs:    No results for input(s): WBC, HGB, HCT, PLT, K, NA, CALCIUM, BUN, CREATININE, LIPASE, AMYLASE, INR, TROPONINI, CKTOTAL, AST, ALT, ALKPHOS, BILITOT in the last 72 hours  Scheduled Meds:  Continuous Infusions:No current facility-administered medications for this encounter  PRN Meds:      Surgical procedures (if appropriate):

## 2021-05-19 NOTE — ASSESSMENT & PLAN NOTE
Pt was experiencing left sided chest pain in association with hypertensive urgency  BP improving and chest pain is resolved    Reviewed cardiac risks - will continue to work on weight loss  Continue Asa 81mg daily, Rosuvastatin 20mg daily

## 2021-05-19 NOTE — PATIENT INSTRUCTIONS
Check blood pressure daily 2 hours after taking medication  If BP < 110/60, HR < 55 call    OK to decrease Amlodipine to 5mg and increase Coreg to 25mg twice daily  Call with any issues

## 2021-05-19 NOTE — ASSESSMENT & PLAN NOTE
BP steadily improving but still above goal  Discussed my concern for potential sleep apnea and the implications of untreated sleep apnea on his health - he states he will consider a sleep study    Has mild lower leg edema  Will trial reducing Norvasc to 5mg and increase Carvedilol to 25mg BID  Given parameters to call with any concerns  Has BP check with PCP office next week  Follow up with me in 4-6 weeks or sooner PRN

## 2021-05-19 NOTE — PROGRESS NOTES
Cardiology Follow Up    Zeb Meyer  1950  [id number]  [facility] [address]  [address]  [phone]    1  Hypertensive crisis  Assessment & Plan:  BP steadily improving but still above goal  Discussed my concern for potential sleep apnea and the implications of untreated sleep apnea on his health - he states he will consider a sleep study  Has mild lower leg edema  Will trial reducing Norvasc to 5mg and increase Carvedilol to 25mg BID  Given parameters to call with any concerns  Has BP check with PCP office next week  Follow up with me in 4-6 weeks or sooner PRN    Orders:  -     amLODIPine (NORVASC) 5 mg tablet; Take 1 tablet (5 mg total) by mouth daily    2  Mixed hyperlipidemia  Assessment & Plan:  Lipids 5/13/21: , TG 62, HDL 49,   Newly started Rosuvastatin 20mg daily  Goal < 100  Statin managed by PCP      3  Chest pain with moderate risk for cardiac etiology  Assessment & Plan:  Pt was experiencing left sided chest pain in association with hypertensive urgency  BP improving and chest pain is resolved  Reviewed cardiac risks - will continue to work on weight loss  Continue Asa 81mg daily, Rosuvastatin 20mg daily        4  Essential hypertension  Assessment & Plan:  BP steadily improving but still above goal  Discussed my concern for potential sleep apnea and the implications of untreated sleep apnea on his health - he states he will consider a sleep study  Has mild lower leg edema  Will trial reducing Norvasc to 5mg and increase Carvedilol to 25mg BID  Given parameters to call with any concerns  Has BP check with PCP office next week  Follow up with me in 4-6 weeks or sooner PRN         Interval History:   Aliyah Salas is following up from his hospitalization for chest pain and hypertensive urgency at [address] [place] [address] on 5/12/21  He underwent echocardiogram showing EF 55%, no RWMA   Mildly increased wall thickness and grade 1 diastolic dysfunction  Nuclear stress test showed no ischemia  Renal artery ultrasound showed no significant renal artery stenosis  His blood pressure did improve with adjustment in his medication with Amlodipine increase from 7 5 to 10mg and Lisinopril increase from 20mg to 40mg  Metoprolol was switched to Carvedilol  He did see his PCP yesterday  She added medication for anxiety  He mentioned leg swelling  She suggested a dose reduction in Amlodipine back to 5mg and increase Carvedilol to 25mg BID  He has not yet made the change as he wished to discuss with our office  He does report resolution of the chest pain  He will intermittently getting facial flushing but that is improving    He does continue to snore and tells me he did have a positive sleep study in the past      Problem List     Chest pain with moderate risk for cardiac etiology    Essential hypertension    Mixed hyperlipidemia    Type 2 diabetes mellitus without complication, without long-term current use of insulin ([facility])      Lab Results   Component Value Date    HGBA1C 7 6 (H) 02/22/2021         Gastroesophageal reflux disease with esophagitis        Past Medical History:   Diagnosis Date    [facility]     High cholesterol     Hypertension      Social History     Socioeconomic History    Marital status: /Civil Union     Spouse name: Not on file    Number of children: Not on file    Years of education: Not on file    Highest education level: Not on file   Occupational History    Not on file   Social Needs    Financial resource strain: Not on file    Food insecurity     Worry: Not on file     Inability: Not on file   [employer] needs     Medical: Not on file     Non-medical: Not on file   Tobacco Use    Smoking status: Former Smoker    Smokeless tobacco: Never Used    Tobacco comment: very light past smoking history   Substance and Sexual Activity    Alcohol use: Not Currently    Drug use: Never    Sexual activity: Not on file     Comment: defer   Lifestyle    Physical activity     Days per week: Not on file     Minutes per session: Not on file    Stress: Not on file   Relationships    Social connections     Talks on phone: Not on file     Gets together: Not on file     Attends Oriental orthodox service: Not on file     Active member of club or organization: Not on file     Attends meetings of clubs or organizations: Not on file     Relationship status: Not on file    Intimate partner violence     Fear of current or ex partner: Not on file     Emotionally abused: Not on file     Physically abused: Not on file     Forced sexual activity: Not on file   Other Topics Concern    Not on file   Social History Narrative    Not on file      Family History   Problem Relation Age of Onset    Clotting disorder Mother     Alcohol abuse Father     Hypertension Father     Coronary artery disease Sister     Hypertension Sister     Hypertension Brother     Diabetes Paternal Grandmother      History reviewed  No pertinent surgical history      Current Outpatient Medications:     amLODIPine (NORVASC) 5 mg tablet, Take 1 tablet (5 mg total) by mouth daily, Disp: 30 tablet, Rfl: 11    aspirin (ECOTRIN LOW STRENGTH) 81 mg EC tablet, Take 1 tablet (81 mg total) by mouth daily, Disp:  , Rfl: 0    busPIRone (BUSPAR) 5 mg tablet, Take 5 mg by mouth 2 (two) times a day, Disp: , Rfl:     carvedilol (COREG) 12 5 mg tablet, Take 1 tablet (12 5 mg total) by mouth 2 (two) times a day with meals, Disp: 60 tablet, Rfl: 0    Cholecalciferol (Vitamin D3) 50 MCG (2000 UT) capsule, Take 2,000 Units by mouth daily after lunch, Disp: , Rfl:     fluticasone (FLONASE) 50 mcg/act nasal spray, 2 sprays into each nostril daily, Disp: , Rfl:     lisinopril (ZESTRIL) 40 mg tablet, Take 1 tablet (40 mg total) by mouth daily, Disp: 30 tablet, Rfl: 0    metFORMIN (GLUCOPHAGE-XR) 500 mg 24 hr tablet, Take 500 mg by mouth every evening, Disp: , Rfl:     omeprazole (PriLOSEC) 20 mg delayed release capsule, Take 40 mg by mouth daily, Disp: , Rfl:     rosuvastatin (CRESTOR) 20 MG tablet, Take 20 mg by mouth every other day , Disp: , Rfl:     saccharomyces boulardii (FLORASTOR) 250 mg capsule, Take 250 mg by mouth daily, Disp: , Rfl:   No Known Allergies    Labs:     Chemistry        Component Value Date/Time    K 4 5 05/12/2021 0251     05/12/2021 0251    CO2 32 05/12/2021 0251    BUN 11 05/12/2021 0251    CREATININE 0 90 05/12/2021 0251        Component Value Date/Time    CALCIUM 9 8 05/12/2021 0251    ALKPHOS 60 05/12/2021 0251    AST 20 05/12/2021 0251    ALT 31 05/12/2021 0251            No results found for: CHOL  Lab Results   Component Value Date    HDL 49 05/12/2021     Lab Results   Component Value Date    LDLCALC 110 (H) 05/12/2021     Lab Results   Component Value Date    TRIG 62 05/12/2021     No results found for: CHOLHDL    Imaging: Xr Chest 1 View Portable    Result Date: 5/12/2021  Narrative: CHEST INDICATION:   Chest pain  COMPARISON:  None EXAM PERFORMED/VIEWS:  XR CHEST PORTABLE FINDINGS: Cardiomediastinal silhouette appears unremarkable  The lungs are clear  No pneumothorax or pleural effusion  Osseous structures appear within normal limits for patient age  Impression: No acute cardiopulmonary disease  Workstation performed: [id number]     Vas Renal Artery Complete    Result Date: 5/14/2021  Narrative:  [facility] REPORT CLINICAL: Indications: Patient presents to evaluate the renal arteries secondary to uncontrolled HTN  Patient is currently on three medications for Blood pressure  Operative History: Denies cardiovascular intervention Risk Factors The patient has history of Obesity, HTN, Diabetes  and HLD  Clinical Right Pressure:  144/82 mm Hg, Left Pressure:  147/80 mm Hg    FINDINGS:  Unilateral       PSV (cm/s)  EDV (cm/s)    RI  Sup-Candelaria Ao               93          22  0 76  Sup Renal Aorta                          0 76  Px Inf-Ramy Ao           103           0  1 00   Right         Impression  PSV (cm/s)  EDV (cm/s)   RAR    RI  Kidney (cm)  Ostial Renal  1 - 59%            193          38  2 08  0 80               Prox Renal                       214          28  2 30  0 87               Mid Renal                        226          41  2 43  0 82               Dist Renal                       231          61  2 48  0 74               Kidney                                                              12 56  Hilum 1                           26           5        0 81               Mid Pole                          34           9        0 74               Hilum 3                           28           6        0 79               Renal         Patent                                                        Left          Impression  PSV (cm/s)  EDV (cm/s)   RAR    RI  Kidney (cm)  Ostial Renal  1 - 59%            182          44  1 96  0 76               Prox Renal                       151          39  1 63  0 74               Mid Renal                        217          42  2 33  0 81               Dist Renal                       158          41  1 70  0 74               Kidney                                                              12 85  Hilum 1                           45           7        0 84               Mid Pole                          40          14        0 65               Hilum 3                           35          11        0 69               Renal         Patent                                                          CONCLUSION: Impression The abdominal aorta is widely patent and normal caliber  RIGHT RENAL: <60% arterial stenosis in the main renal artery  Patent renal vein Renovascular resistive index of 0 81 Renal/Aorta Ratio: 2 48  The Kidney measures 12 5 cm  LEFT RENAL: <60% arterial stenosis in the main renal artery    Patent renal vein Renovascular resistive index of 0 84  Renal/Aorta Ratio: 1 96   The Kidney measures 12 8 cm MESENTERIC: Celiac and superior mesenteric arteries were not visualized; mid line was obscured by bowel gas  Technically difficult/limited study to heavy overlying bowel gas  There are no prior studies for comparison  SIGNATURE: Electronically Signed by: Lola Rubi MD, RPVI on 2021-05-14 11:15:46 AM      Review of Systems   Constitution: Negative  HENT: Negative  Cardiovascular: Positive for leg swelling  Negative for chest pain, dyspnea on exertion, irregular heartbeat, near-syncope, orthopnea and palpitations  Respiratory: Negative for cough and snoring  Endocrine: Negative  Skin: Negative  Musculoskeletal: Negative  Gastrointestinal: Negative  Genitourinary: Negative  Neurological: Negative  Psychiatric/Behavioral: Negative  Vitals:    05/19/21 1437   BP: 140/80   Pulse:    SpO2:      Vitals:    05/19/21 1347   Weight: 118 kg (260 lb)     Height: 5' 9" (175 3 cm)   Body mass index is 38 4 kg/m²  Physical Exam   Constitutional: He is oriented to person, place, and time  He appears well-developed and well-nourished  No distress  HENT:   Head: Normocephalic and atraumatic  Neck: No JVD present  Carotid bruit is not present  Cardiovascular: Normal rate, regular rhythm, S1 normal, S2 normal, normal heart sounds and intact distal pulses  Exam reveals no gallop and no friction rub  No murmur heard  Mild, non-pitting ankle edema   Pulmonary/Chest: Effort normal and breath sounds normal  No respiratory distress  Abdominal: Soft  He exhibits no distension  There is no abdominal tenderness  Neurological: He is alert and oriented to person, place, and time  Skin: Skin is warm and dry  No rash noted  He is not diaphoretic  Psychiatric: He has a normal mood and affect  His speech is normal and behavior is normal  Cognition and memory are normal    Nursing note and vitals reviewed

## 2021-05-20 DIAGNOSIS — I16.9 HYPERTENSIVE CRISIS: ICD-10-CM

## 2021-05-20 RX ORDER — LISINOPRIL 40 MG/1
40 TABLET ORAL DAILY
Qty: 30 TABLET | Refills: 11 | Status: SHIPPED | OUTPATIENT
Start: 2021-05-20 | End: 2021-07-16 | Stop reason: SDUPTHER

## 2021-06-22 ENCOUNTER — TELEPHONE (OUTPATIENT)
Dept: CARDIOLOGY CLINIC | Facility: CLINIC | Age: 71
End: 2021-06-22

## 2021-06-22 DIAGNOSIS — I16.9 HYPERTENSIVE CRISIS: ICD-10-CM

## 2021-06-22 NOTE — TELEPHONE ENCOUNTER
The pt called and stated that he had been feeling lightheaded/ dizziness/ overall "groggy"  feeling  He states that even if he has a good night sleep he is still tired the next day  He took his sugar and it was good  He then checked his BP this am, it was 108/ 65 HR 75    His PCP increased his amlodipine to 7 5 daily and the coreg to 25MG daily apx 1 month ago  He has recently loss weight and is now 245lbs  He is questioning if due to the weight loss the medications are to strong and need to be lowered

## 2021-07-01 ENCOUNTER — OFFICE VISIT (OUTPATIENT)
Dept: CARDIOLOGY CLINIC | Facility: CLINIC | Age: 71
End: 2021-07-01
Payer: MEDICARE

## 2021-07-01 VITALS
WEIGHT: 247 LBS | HEART RATE: 64 BPM | SYSTOLIC BLOOD PRESSURE: 116 MMHG | HEIGHT: 69 IN | DIASTOLIC BLOOD PRESSURE: 70 MMHG | TEMPERATURE: 97.3 F | BODY MASS INDEX: 36.58 KG/M2 | OXYGEN SATURATION: 98 %

## 2021-07-01 DIAGNOSIS — R07.9 CHEST PAIN WITH MODERATE RISK FOR CARDIAC ETIOLOGY: Primary | ICD-10-CM

## 2021-07-01 DIAGNOSIS — E78.2 MIXED HYPERLIPIDEMIA: ICD-10-CM

## 2021-07-01 DIAGNOSIS — I10 ESSENTIAL HYPERTENSION: ICD-10-CM

## 2021-07-01 PROCEDURE — 99214 OFFICE O/P EST MOD 30 MIN: CPT | Performed by: NURSE PRACTITIONER

## 2021-07-01 NOTE — ASSESSMENT & PLAN NOTE
Excellent improvement with lifestyle changes and weight reduction of 15 pounds  Now with symptoms of lightheadedness/dizziness with lower blood pressure  Will reduce Amlodipine to 5mg daily  If BP remains < 120/80 ok to reduce Carvedilol back to 12 5mg BID  Send portal update in 1-2 weeks and continue to monitor BP daily  Discussed my concern for potential sleep apnea and the implications of untreated sleep apnea on his health - he states he will consider a sleep study    Follow up as scheduled or sooner PRN

## 2021-07-01 NOTE — ASSESSMENT & PLAN NOTE
Pt was experiencing left sided chest pain in association with hypertensive urgency  BP improving and chest pain is resolved  Reviewed cardiac risks - he has been making excellent changes with 15 pound weight reduction, HA1c dropped from 7 8 to 6 2, BP improving    will continue to work on weight loss  Continue Asa 81mg daily, Rosuvastatin 20mg daily

## 2021-07-01 NOTE — PATIENT INSTRUCTIONS
Keep up the good work! Please reduce Amlodipine back to 5mg daily  If blood pressure remains < 120/80 ok to reduce Carvedilol back to 12 5mg twice daily  Will continue to monitor symptoms and blood pressure and adjust meds as needed  Please notify me through portal with how you are doing after 2 weeks  Call with any concerns

## 2021-07-01 NOTE — PROGRESS NOTES
Cardiology Follow Up    Claudia Angeles  1950  [id number]  [facility]  [address]  [place] [id number] [phone]    Houston Wan is following up today for routine recheck of his HTN, chest pain, and HLD  1  Chest pain with moderate risk for cardiac etiology  Assessment & Plan:  Pt was experiencing left sided chest pain in association with hypertensive urgency  BP improving and chest pain is resolved  Reviewed cardiac risks - he has been making excellent changes with 15 pound weight reduction, HA1c dropped from 7 8 to 6 2, BP improving  will continue to work on weight loss  Continue Asa 81mg daily, Rosuvastatin 20mg daily        2  Essential hypertension  Assessment & Plan:  Excellent improvement with lifestyle changes and weight reduction of 15 pounds  Now with symptoms of lightheadedness/dizziness with lower blood pressure  Will reduce Amlodipine to 5mg daily  If BP remains < 120/80 ok to reduce Carvedilol back to 12 5mg BID  Send portal update in 1-2 weeks and continue to monitor BP daily  Discussed my concern for potential sleep apnea and the implications of untreated sleep apnea on his health - he states he will consider a sleep study  Follow up as scheduled or sooner PRN      3  Mixed hyperlipidemia  Assessment & Plan:  Lipids 5/13/21: , TG 62, HDL 49,   Newly started Rosuvastatin 20mg daily  Goal < 100  Statin managed by PCP       Houston Wan has a history of HTN, HLD, DM type 2  He was hospitalized for chest pain and hypertensive urgency at [facility] on 5/12/21  He underwent echocardiogram showing EF 55%, no RWMA  Mildly increased wall thickness and grade 1 diastolic dysfunction  Nuclear stress test showed no ischemia  Renal artery ultrasound showed no significant renal artery stenosis    His blood pressure did improve with adjustment in his medication with Amlodipine increase from 7 5 to 10mg and Lisinopril increase from 20mg to 40mg  Metoprolol was switched to Carvedilol      He followed up with me on 5/19/2021  He did see his PCP the day prior, who added medication for anxiety  He mentioned leg swelling  She suggested a dose reduction in Amlodipine back to 5mg and increase Carvedilol to 25mg BID  He has not yet made the change as he wished to discuss with our office  He does report resolution of the chest pain  He will intermittently getting facial flushing but that is improving  He does continue to snore and tells me he did have a positive sleep study in the past    His BP remained somewhat elevated at PCP recheck and his Amlodipine was increased back to 7 5mg daily along with Carvedilol increase to 25mg BID  Interval History: Since his last visit Latanya Artis has been working hard on lifestyle changes  He has cut sugars, increased activity  He has lost 15 pounds, his sugar improved, his back pain is reduced  He denies chest pain, shortness of breath  Leg swelling is improved  His only complaint is he is noticing some lightheadedness at times  His BP at home is 110's/70's      Medical Problems     Problem List     Chest pain with moderate risk for cardiac etiology    Essential hypertension    Mixed hyperlipidemia    Type 2 diabetes mellitus without complication, without long-term current use of insulin ([facility])       Lab Results   Component Value Date    HGBA1C 7 6 (H) 02/22/2021              Gastroesophageal reflux disease with esophagitis              Past Medical History:   Diagnosis Date    [facility]     High cholesterol     Hypertension      Social History     Socioeconomic History    Marital status: /Civil Union     Spouse name: Not on file    Number of children: Not on file    Years of education: Not on file    Highest education level: Not on file   Occupational History    Not on file   Tobacco Use    Smoking status: Former Smoker    Smokeless tobacco: Never Used  Tobacco comment: very light past smoking history   Vaping Use    Vaping Use: Never used   Substance and Sexual Activity    Alcohol use: Not Currently    Drug use: Never    Sexual activity: Not on file     Comment: defer   Other Topics Concern    Not on file   Social History Narrative    Not on file     Social Determinants of Health     Financial Resource Strain:     Difficulty of Paying Living Expenses:    Food Insecurity:     Worried About Running Out of Food in the Last Year:     [address] in the Last Year:    Transportation Needs:     Lack of Transportation (Medical):  Lack of Transportation (Non-Medical):    Physical Activity:     Days of Exercise per Week:     Minutes of Exercise per Session:    Stress:     Feeling of Stress :    Social Connections:     Frequency of Communication with Friends and Family:     Frequency of Social Gatherings with Friends and Family:     Attends [employer] Services:     Active Member of Clubs or Organizations:     Attends Club or Organization Meetings:     Marital Status:    Intimate Partner Violence:     Fear of Current or Ex-Partner:     Emotionally Abused:     Physically Abused:     Sexually Abused:       Family History   Problem Relation Age of Onset    Clotting disorder Mother     Alcohol abuse Father     Hypertension Father     Coronary artery disease Sister     Hypertension Sister     Hypertension Brother     Diabetes Paternal Grandmother      History reviewed  No pertinent surgical history      Current Outpatient Medications:     amLODIPine (NORVASC) 5 mg tablet, Take 1 tablet (5 mg total) by mouth daily, Disp: 30 tablet, Rfl: 11    aspirin (ECOTRIN LOW STRENGTH) 81 mg EC tablet, Take 1 tablet (81 mg total) by mouth daily, Disp:  , Rfl: 0    busPIRone (BUSPAR) 5 mg tablet, Take 5 mg by mouth 2 (two) times a day, Disp: , Rfl:     carvedilol (COREG) 12 5 mg tablet, Take 1 tablet (12 5 mg total) by mouth 2 (two) times a day with meals (Patient taking differently: Take 25 mg by mouth 2 (two) times a day with meals ), Disp: 60 tablet, Rfl: 0    Cholecalciferol (Vitamin D3) 50 MCG (2000 UT) capsule, Take 2,000 Units by mouth daily after lunch, Disp: , Rfl:     lisinopril (ZESTRIL) 40 mg tablet, Take 1 tablet (40 mg total) by mouth daily, Disp: 30 tablet, Rfl: 11    metFORMIN (GLUCOPHAGE-XR) 500 mg 24 hr tablet, Take 500 mg by mouth every evening, Disp: , Rfl:     omeprazole (PriLOSEC) 20 mg delayed release capsule, Take 40 mg by mouth daily, Disp: , Rfl:     rosuvastatin (CRESTOR) 20 MG tablet, Take 20 mg by mouth every other day , Disp: , Rfl:     fluticasone (FLONASE) 50 mcg/act nasal spray, 2 sprays into each nostril daily, Disp: , Rfl:     saccharomyces boulardii (FLORASTOR) 250 mg capsule, Take 250 mg by mouth daily (Patient not taking: Reported on 7/1/2021), Disp: , Rfl:   No Known Allergies    Labs:     Chemistry        Component Value Date/Time    K 4 5 05/12/2021 0251     05/12/2021 0251    CO2 32 05/12/2021 0251    BUN 11 05/12/2021 0251    CREATININE 0 90 05/12/2021 0251        Component Value Date/Time    CALCIUM 9 8 05/12/2021 0251    ALKPHOS 60 05/12/2021 0251    AST 20 05/12/2021 0251    ALT 31 05/12/2021 0251            No results found for: CHOL  Lab Results   Component Value Date    HDL 49 05/12/2021     Lab Results   Component Value Date    LDLCALC 110 (H) 05/12/2021     Lab Results   Component Value Date    TRIG 62 05/12/2021     No results found for: CHOLHDL    Imaging:   Echocardiogram 5/13/2021: EF 55%  [address]  LV wall thickness mildly increased  Grade 1 diastolic dysfunction  Trace MR  Trace TR  Nuclear stress test 5/13/2021: Sestamibi  No chest pain during study  EF 50%  Image artifact, likely diaphragm attentuation  Normal study  ECG 5/17/2021: normal sinus rhythm, nonspecific ST changes  Rate 75      Review of Systems   Constitutional: Negative  HENT: Negative      Cardiovascular: Positive for leg swelling  Negative for chest pain, dyspnea on exertion, irregular heartbeat, near-syncope, orthopnea and palpitations  Respiratory: Negative for cough, shortness of breath and snoring  Endocrine: Negative  Skin: Negative  Musculoskeletal: Negative  Gastrointestinal: Negative  Genitourinary: Negative  Neurological: Positive for dizziness and light-headedness  Psychiatric/Behavioral: Negative  Vitals:    07/01/21 0817   BP: 116/76   Pulse: 64   Temp: (!) 97 3 °F (36 3 °C)   SpO2: 98%     Vitals:    07/01/21 0817   Weight: 112 kg (247 lb)     Height: 5' 9" (175 3 cm)   Body mass index is 36 48 kg/m²  Physical Exam  Vitals and nursing note reviewed  Constitutional:       General: He is not in acute distress  Appearance: He is well-developed  He is not diaphoretic  HENT:      Head: Normocephalic and atraumatic  Neck:      Vascular: No carotid bruit or JVD  Cardiovascular:      Rate and Rhythm: Normal rate and regular rhythm  Pulses: Intact distal pulses  Heart sounds: Normal heart sounds, S1 normal and S2 normal  No murmur heard  No friction rub  No gallop  Comments: Mild lower leg edema bilaterally, left > right  Pulmonary:      Effort: Pulmonary effort is normal  No respiratory distress  Breath sounds: Normal breath sounds  Abdominal:      General: There is no distension  Palpations: Abdomen is soft  Tenderness: There is no abdominal tenderness  Skin:     General: Skin is warm and dry  Findings: No rash  Neurological:      Mental Status: He is alert and oriented to person, place, and time     Psychiatric:         Behavior: Behavior normal

## 2021-07-09 ENCOUNTER — PATIENT MESSAGE (OUTPATIENT)
Dept: CARDIOLOGY CLINIC | Facility: CLINIC | Age: 71
End: 2021-07-09

## 2021-07-09 NOTE — TELEPHONE ENCOUNTER
From: Rosemary Morin  To:  LUIS MANUEL Kulkarni  Sent: 7/9/2021 1:13 PM EDT  Subject: Non-Urgent Medical Question    Not sure but I think once the amloadpine got out of my system from the 7 5 to the only five my blood pressure is starting to rise again at nine it was good at noon it was a little high 144 over 67 aT nine in the morning it was 122 over 64

## 2021-07-09 NOTE — TELEPHONE ENCOUNTER
States that his ENT told him that sinuses look fine and not causing the headaches  States that along with headaches, he feel tense  And just not himself

## 2021-07-09 NOTE — TELEPHONE ENCOUNTER
From: Jomar Ward  To:  LUIS MANUEL Goodman  Sent: 7/9/2021 8:46 AM EDT  Subject: Non-Urgent Medical Question    Ovidio Kuhn I seem to be getting headaches all the time blood pressure has bin in the mid to high teens bottom number between 62 and no higher than 68

## 2021-07-10 ENCOUNTER — APPOINTMENT (EMERGENCY)
Dept: CT IMAGING | Facility: HOSPITAL | Age: 71
End: 2021-07-10
Payer: MEDICARE

## 2021-07-10 ENCOUNTER — APPOINTMENT (EMERGENCY)
Dept: RADIOLOGY | Facility: HOSPITAL | Age: 71
End: 2021-07-10
Payer: MEDICARE

## 2021-07-10 ENCOUNTER — HOSPITAL ENCOUNTER (EMERGENCY)
Facility: HOSPITAL | Age: 71
Discharge: HOME | End: 2021-07-10
Attending: EMERGENCY MEDICINE
Payer: MEDICARE

## 2021-07-10 VITALS
DIASTOLIC BLOOD PRESSURE: 65 MMHG | RESPIRATION RATE: 20 BRPM | SYSTOLIC BLOOD PRESSURE: 143 MMHG | WEIGHT: 269.84 LBS | TEMPERATURE: 97 F | OXYGEN SATURATION: 94 % | BODY MASS INDEX: 39.85 KG/M2 | HEART RATE: 67 BPM

## 2021-07-10 DIAGNOSIS — R03.0 ELEVATED BLOOD PRESSURE READING: Primary | ICD-10-CM

## 2021-07-10 DIAGNOSIS — R42 LIGHTHEADEDNESS: ICD-10-CM

## 2021-07-10 LAB
ALBUMIN SERPL BCP-MCNC: 4 G/DL (ref 3.5–5)
ALP SERPL-CCNC: 74 U/L (ref 46–116)
ALT SERPL W P-5'-P-CCNC: 32 U/L (ref 12–78)
ANION GAP SERPL CALCULATED.3IONS-SCNC: 5 MMOL/L (ref 4–13)
APTT PPP: 27 SECONDS (ref 23–37)
AST SERPL W P-5'-P-CCNC: 14 U/L (ref 5–45)
BASOPHILS # BLD AUTO: 0.04 THOUSANDS/ΜL (ref 0–0.1)
BASOPHILS NFR BLD AUTO: 1 % (ref 0–1)
BILIRUB SERPL-MCNC: 0.49 MG/DL (ref 0.2–1)
BILIRUB UR QL STRIP: NEGATIVE
BUN SERPL-MCNC: 12 MG/DL (ref 5–25)
CALCIUM SERPL-MCNC: 9.4 MG/DL (ref 8.3–10.1)
CHLORIDE SERPL-SCNC: 100 MMOL/L (ref 100–108)
CK SERPL-CCNC: 69 U/L (ref 39–308)
CLARITY UR: CLEAR
CO2 SERPL-SCNC: 32 MMOL/L (ref 21–32)
COLOR UR: YELLOW
CREAT SERPL-MCNC: 0.87 MG/DL (ref 0.6–1.3)
EOSINOPHIL # BLD AUTO: 0.09 THOUSAND/ΜL (ref 0–0.61)
EOSINOPHIL NFR BLD AUTO: 1 % (ref 0–6)
ERYTHROCYTE [DISTWIDTH] IN BLOOD BY AUTOMATED COUNT: 11.9 % (ref 11.6–15.1)
GFR SERPL CREATININE-BSD FRML MDRD: 88 ML/MIN/1.73SQ M
GLUCOSE SERPL-MCNC: 161 MG/DL (ref 65–140)
GLUCOSE UR STRIP-MCNC: NEGATIVE MG/DL
HCT VFR BLD AUTO: 43.3 % (ref 36.5–49.3)
HGB BLD-MCNC: 14.9 G/DL (ref 12–17)
HGB UR QL STRIP.AUTO: NEGATIVE
IMM GRANULOCYTES # BLD AUTO: 0.03 THOUSAND/UL (ref 0–0.2)
IMM GRANULOCYTES NFR BLD AUTO: 0 % (ref 0–2)
INR PPP: 1 (ref 0.84–1.19)
KETONES UR STRIP-MCNC: NEGATIVE MG/DL
LACTATE SERPL-SCNC: 1 MMOL/L (ref 0.5–2)
LEUKOCYTE ESTERASE UR QL STRIP: NEGATIVE
LYMPHOCYTES # BLD AUTO: 1.67 THOUSANDS/ΜL (ref 0.6–4.47)
LYMPHOCYTES NFR BLD AUTO: 25 % (ref 14–44)
MAGNESIUM SERPL-MCNC: 1.4 MG/DL (ref 1.6–2.6)
MCH RBC QN AUTO: 31.7 PG (ref 26.8–34.3)
MCHC RBC AUTO-ENTMCNC: 34.4 G/DL (ref 31.4–37.4)
MCV RBC AUTO: 92 FL (ref 82–98)
MONOCYTES # BLD AUTO: 0.58 THOUSAND/ΜL (ref 0.17–1.22)
MONOCYTES NFR BLD AUTO: 9 % (ref 4–12)
NEUTROPHILS # BLD AUTO: 4.28 THOUSANDS/ΜL (ref 1.85–7.62)
NEUTS SEG NFR BLD AUTO: 64 % (ref 43–75)
NITRITE UR QL STRIP: NEGATIVE
NRBC BLD AUTO-RTO: 0 /100 WBCS
PH UR STRIP.AUTO: 5.5 [PH]
PLATELET # BLD AUTO: 210 THOUSANDS/UL (ref 149–390)
PMV BLD AUTO: 11.8 FL (ref 8.9–12.7)
POTASSIUM SERPL-SCNC: 4.2 MMOL/L (ref 3.5–5.3)
PROT SERPL-MCNC: 7.5 G/DL (ref 6.4–8.2)
PROT UR STRIP-MCNC: NEGATIVE MG/DL
PROTHROMBIN TIME: 13 SECONDS (ref 11.6–14.5)
RBC # BLD AUTO: 4.7 MILLION/UL (ref 3.88–5.62)
SODIUM SERPL-SCNC: 137 MMOL/L (ref 136–145)
SP GR UR STRIP.AUTO: 1.02 (ref 1–1.03)
TROPONIN I SERPL-MCNC: <0.02 NG/ML
UROBILINOGEN UR QL STRIP.AUTO: 0.2 E.U./DL
WBC # BLD AUTO: 6.69 THOUSAND/UL (ref 4.31–10.16)

## 2021-07-10 PROCEDURE — 85730 THROMBOPLASTIN TIME PARTIAL: CPT | Performed by: PHYSICIAN ASSISTANT

## 2021-07-10 PROCEDURE — 71045 X-RAY EXAM CHEST 1 VIEW: CPT

## 2021-07-10 PROCEDURE — 85610 PROTHROMBIN TIME: CPT | Performed by: PHYSICIAN ASSISTANT

## 2021-07-10 PROCEDURE — 82550 ASSAY OF CK (CPK): CPT | Performed by: PHYSICIAN ASSISTANT

## 2021-07-10 PROCEDURE — 36415 COLL VENOUS BLD VENIPUNCTURE: CPT | Performed by: PHYSICIAN ASSISTANT

## 2021-07-10 PROCEDURE — 99284 EMERGENCY DEPT VISIT MOD MDM: CPT

## 2021-07-10 PROCEDURE — 84484 ASSAY OF TROPONIN QUANT: CPT | Performed by: PHYSICIAN ASSISTANT

## 2021-07-10 PROCEDURE — 85025 COMPLETE CBC W/AUTO DIFF WBC: CPT | Performed by: PHYSICIAN ASSISTANT

## 2021-07-10 PROCEDURE — 99285 EMERGENCY DEPT VISIT HI MDM: CPT | Performed by: PHYSICIAN ASSISTANT

## 2021-07-10 PROCEDURE — 83735 ASSAY OF MAGNESIUM: CPT | Performed by: PHYSICIAN ASSISTANT

## 2021-07-10 PROCEDURE — 96365 THER/PROPH/DIAG IV INF INIT: CPT

## 2021-07-10 PROCEDURE — 93005 ELECTROCARDIOGRAM TRACING: CPT

## 2021-07-10 PROCEDURE — 96361 HYDRATE IV INFUSION ADD-ON: CPT

## 2021-07-10 PROCEDURE — 83605 ASSAY OF LACTIC ACID: CPT | Performed by: PHYSICIAN ASSISTANT

## 2021-07-10 PROCEDURE — 80053 COMPREHEN METABOLIC PANEL: CPT | Performed by: PHYSICIAN ASSISTANT

## 2021-07-10 PROCEDURE — 70450 CT HEAD/BRAIN W/O DYE: CPT

## 2021-07-10 PROCEDURE — 81003 URINALYSIS AUTO W/O SCOPE: CPT | Performed by: PHYSICIAN ASSISTANT

## 2021-07-10 RX ORDER — ACETAMINOPHEN 325 MG/1
650 TABLET ORAL ONCE
Status: COMPLETED | OUTPATIENT
Start: 2021-07-10 | End: 2021-07-10

## 2021-07-10 RX ORDER — MAGNESIUM SULFATE 1 G/100ML
1 INJECTION INTRAVENOUS ONCE
Status: COMPLETED | OUTPATIENT
Start: 2021-07-10 | End: 2021-07-10

## 2021-07-10 RX ADMIN — SODIUM CHLORIDE 500 ML: 0.9 INJECTION, SOLUTION INTRAVENOUS at 10:10

## 2021-07-10 RX ADMIN — ACETAMINOPHEN 650 MG: 325 TABLET ORAL at 10:09

## 2021-07-10 RX ADMIN — MAGNESIUM SULFATE HEPTAHYDRATE 1 G: 1 INJECTION, SOLUTION INTRAVENOUS at 11:02

## 2021-07-10 NOTE — ED NOTES
Pt in no acute distress  Ambulates with a steady gait   Verbalizes understanding of discharge instructions       Lex Coleman RN  07/10/21 [id number]

## 2021-07-10 NOTE — ED PROVIDER NOTES
History  Chief Complaint   Patient presents with    Hypertension     Pt concerned that blood pressure is high - was 135/62 at waking - then 1 hr after meds was 142/? then 166/84 before leaving to come to ED - states "it's always been in the 120's - also c/o dizziness     Patient is a 68-year-old male with a past medical history of hypertension, dyslipidemia, diabetes who presents emergency department today with a complaint elevated blood pressure today with lightheadedness  Patient states that he has been losing weight and his pressure has been remaining around 098-037 systolic  He states that today upon waking up he took his blood pressure and it was 135/62  He states that this is high for him and he felt lightheaded  He then ate breakfast and took his morning medications including his blood pressure medications  He states that he waited approximately 2 hours to recheck his blood pressure and it was 611 systolic, and then 391A systolic, while he was still feeling lightheaded so we came into the ED for evaluation  He states that his lightheadedness is worse when is and spinning sensations  He states that he has had a waxing and waning generalized headache x 3 days, which he has been taking tylenol for  He states that his headache is currently a 6/10 sensation  He denies any falls, blurred vision, neck pain, chest pain, sOB, N/V/D, abdominal pain, back pain, numbness/tingling/ weakness  He has not had any medication adjustments recently  He states that today's headache is similar to previous headaches         Hypertension  Severity:  Moderate  Onset quality:  Gradual  Timing:  Constant  Progression:  Improving  Chronicity:  Recurrent  Time since last dose of antihypertensive:  3 hours  Notable PTA blood pressures:  170/80  Relieved by:  Nothing  Worsened by:  Nothing  Ineffective treatments:  Rest  Associated symptoms: dizziness and headaches    Associated symptoms: no abdominal pain, no anxiety, no blurred vision, no chest pain, no confusion, no ear pain, no epistaxis, no fatigue, no fever, no hematuria, no hypokalemia, no loss of consciousness, no nausea, no neck pain, no palpitations, no peripheral edema, no shortness of breath, no syncope, no tinnitus, not vomiting and no weakness    Dizziness:     Severity:  Mild    Duration:  1 day    Timing:  Intermittent    Progression:  Unchanged  Headaches:     Severity:  Moderate    Onset quality:  Gradual    Duration:  3 days    Timing:  Constant    Progression:  Waxing and waning    Chronicity:  New  Risk factors: diabetes        Prior to Admission Medications   Prescriptions Last Dose Informant Patient Reported? Taking?    Cholecalciferol (Vitamin D3) 50 MCG (2000 UT) capsule 2021 at Unknown time  Yes Yes   Sig: Take 2,000 Units by mouth daily after lunch   amLODIPine (NORVASC) 5 mg tablet 7/10/2021 at Unknown time  No Yes   Sig: Take 1 tablet (5 mg total) by mouth daily   aspirin (ECOTRIN LOW STRENGTH) 81 mg EC tablet 7/10/2021 at Unknown time  No Yes   Sig: Take 1 tablet (81 mg total) by mouth daily   busPIRone (BUSPAR) 5 mg tablet 7/10/2021 at Unknown time  Yes Yes   Sig: Take 5 mg by mouth 2 (two) times a day   carvedilol (COREG) 12 5 mg tablet 7/10/2021 at Unknown time  No Yes   Sig: Take 1 tablet (12 5 mg total) by mouth 2 (two) times a day with meals   Patient taking differently: Take 25 mg by mouth 2 (two) times a day with meals    fluticasone (FLONASE) 50 mcg/act nasal spray 2021 at Unknown time  Yes Yes   Si sprays into each nostril daily   lisinopril (ZESTRIL) 40 mg tablet 7/10/2021 at Unknown time  No Yes   Sig: Take 1 tablet (40 mg total) by mouth daily   metFORMIN (GLUCOPHAGE-XR) 500 mg 24 hr tablet 2021 at Unknown time  Yes Yes   Sig: Take 500 mg by mouth every evening   omeprazole (PriLOSEC) 20 mg delayed release capsule 2021 at Unknown time  Yes Yes   Sig: Take 40 mg by mouth daily   rosuvastatin (CRESTOR) 20 MG tablet 7/10/2021 at Unknown time  Yes Yes   Sig: Take 20 mg by mouth every other day       Facility-Administered Medications: None       Past Medical History:   Diagnosis Date    Diabetes mellitus ([address] )     High cholesterol     Hypertension     Seasonal allergies        Past Surgical History:   Procedure Laterality Date    FOOT SURGERY Left     great toe    SHOULDER SURGERY Right        Family History   Problem Relation Age of Onset    Clotting disorder Mother     Alcohol abuse Father     Hypertension Father     Coronary artery disease Sister     Hypertension Sister     Hypertension Brother     Diabetes Paternal Grandmother      I have reviewed and agree with the history as documented  E-Cigarette/Vaping    E-Cigarette Use Never User      E-Cigarette/Vaping Substances     Social History     Tobacco Use    Smoking status: Former Smoker    Smokeless tobacco: Never Used    Tobacco comment: very light past smoking history   Vaping Use    Vaping Use: Never used   Substance Use Topics    Alcohol use: Not Currently    Drug use: Never       Review of Systems   Constitutional: Negative for chills, fatigue and fever  HENT: Negative for ear pain, nosebleeds and tinnitus  Eyes: Negative for blurred vision, pain and visual disturbance  Respiratory: Negative for shortness of breath and stridor  Cardiovascular: Negative for chest pain, palpitations and syncope  Gastrointestinal: Negative for abdominal pain, nausea and vomiting  Genitourinary: Negative for dysuria and hematuria  Musculoskeletal: Negative for arthralgias, back pain and neck pain  Skin: Negative for color change and rash  Neurological: Positive for dizziness and headaches  Negative for seizures, loss of consciousness, speech difficulty and weakness  Psychiatric/Behavioral: Negative for confusion  The patient is not nervous/anxious  All other systems reviewed and are negative        Physical Exam  Physical Exam  Vitals and nursing note reviewed  Constitutional:       Appearance: He is well-developed  HENT:      Head: Normocephalic and atraumatic  Mouth/Throat:      Mouth: Mucous membranes are moist    Eyes:      Extraocular Movements: Extraocular movements intact  Conjunctiva/sclera: Conjunctivae normal       Pupils: Pupils are equal, round, and reactive to light  Cardiovascular:      Rate and Rhythm: Normal rate and regular rhythm  Heart sounds: No murmur heard  Pulmonary:      Effort: Pulmonary effort is normal  No respiratory distress  Breath sounds: Normal breath sounds  Abdominal:      Palpations: Abdomen is soft  Tenderness: There is no abdominal tenderness  There is no right CVA tenderness, left CVA tenderness or rebound  Hernia: No hernia is present  Musculoskeletal:      Cervical back: Normal range of motion and neck supple  Right lower leg: No edema  Left lower leg: No edema  Skin:     General: Skin is warm and dry  Capillary Refill: Capillary refill takes less than 2 seconds  Neurological:      General: No focal deficit present  Mental Status: He is alert and oriented to person, place, and time           Vital Signs  ED Triage Vitals [07/10/21 0953]   Temperature Pulse Respirations Blood Pressure SpO2   (!) 97 °F (36 1 °C) 77 20 164/79 98 %      Temp Source Heart Rate Source Patient Position - Orthostatic VS BP Location FiO2 (%)   Temporal Monitor Lying Left arm --      Pain Score       No Pain           Vitals:    07/10/21 0953 07/10/21 1030 07/10/21 1101 07/10/21 1130   BP: 164/79 148/65 124/62 143/65   Pulse: 77 68 70 67   Patient Position - Orthostatic VS: Lying            Visual Acuity  Visual Acuity      Most Recent Value   L Pupil Size (mm)  3   R Pupil Size (mm)  3          ED Medications  Medications   sodium chloride 0 9 % bolus 500 mL (0 mL Intravenous Stopped 7/10/21 1101)   acetaminophen (TYLENOL) tablet 650 mg (650 mg Oral Given 7/10/21 1009)   magnesium sulfate IVPB (premix) SOLN 1 g (0 g Intravenous Stopped 7/10/21 1143)       Diagnostic Studies  Results Reviewed     Procedure Component Value Units Date/Time    Troponin I [[id number]]  (Normal) Collected: 07/10/21 1048    Lab Status: Final result Specimen: Blood from Arm, Right Updated: 07/10/21 1127     Troponin I <0 02 ng/mL     Lactic acid [[id number]]  (Normal) Collected: 07/10/21 1009    Lab Status: Final result Specimen: Blood from Arm, Right Updated: 07/10/21 1043     LACTIC ACID 1 0 mmol/L     Narrative:      Result may be elevated if tourniquet was used during collection      Comprehensive metabolic panel [[id number]]  (Abnormal) Collected: 07/10/21 1009    Lab Status: Final result Specimen: Blood from Arm, Right Updated: 07/10/21 1038     Sodium 137 mmol/L      Potassium 4 2 mmol/L      Chloride 100 mmol/L      CO2 32 mmol/L      ANION GAP 5 mmol/L      BUN 12 mg/dL      Creatinine 0 87 mg/dL      Glucose 161 mg/dL      Calcium 9 4 mg/dL      AST 14 U/L      ALT 32 U/L      Alkaline Phosphatase 74 U/L      Total Protein 7 5 g/dL      Albumin 4 0 g/dL      Total Bilirubin 0 49 mg/dL      eGFR 88 ml/min/1 73sq m     Narrative:      [place] guidelines for Chronic Kidney Disease (CKD):     Stage 1 with normal or high GFR (GFR > 90 mL/min/1 73 square meters)    Stage 2 Mild CKD (GFR = 60-89 mL/min/1 73 square meters)    Stage 3A Moderate CKD (GFR = 45-59 mL/min/1 73 square meters)    Stage 3B Moderate CKD (GFR = 30-44 mL/min/1 73 square meters)    Stage 4 Severe CKD (GFR = 15-29 mL/min/1 73 square meters)    Stage 5 End Stage CKD (GFR <15 mL/min/1 73 square meters)  Note: GFR calculation is accurate only with a steady state creatinine    Magnesium [[id number]]  (Abnormal) Collected: 07/10/21 1009    Lab Status: Final result Specimen: Blood from Arm, Right Updated: 07/10/21 1038     Magnesium 1 4 mg/dL     CK Total with Reflex CKMB [[id number]]  (Normal) Collected: 07/10/21 1009 Lab Status: Final result Specimen: Blood from Arm, Right Updated: 07/10/21 1038     Total CK 69 U/L     Protime-INR [[id number]]  (Normal) Collected: 07/10/21 1009    Lab Status: Final result Specimen: Blood from Arm, Right Updated: 07/10/21 1038     Protime 13 0 seconds      INR 1 00    APTT [[id number]]  (Normal) Collected: 07/10/21 1009    Lab Status: Final result Specimen: Blood from Arm, Right Updated: 07/10/21 1038     PTT 27 seconds     UA w Reflex to Microscopic w Reflex to Culture [[id number]] Collected: 07/10/21 1009    Lab Status: Final result Specimen: Urine, Clean Catch Updated: 07/10/21 1019     Color, UA Yellow     Clarity, UA Clear     Specific Gravity, UA 1 025     pH, UA 5 5     Leukocytes, UA Negative     Nitrite, UA Negative     Protein, UA Negative mg/dl      Glucose, UA Negative mg/dl      Ketones, UA Negative mg/dl      Urobilinogen, UA 0 2 E U /dl      Bilirubin, UA Negative     Blood, UA Negative    CBC and differential [[id number]] Collected: 07/10/21 1009    Lab Status: Final result Specimen: Blood from Arm, Right Updated: 07/10/21 1018     WBC 6 69 Thousand/uL      RBC 4 70 Million/uL      Hemoglobin 14 9 g/dL      Hematocrit 43 3 %      MCV 92 fL      MCH 31 7 pg      MCHC 34 4 g/dL      RDW 11 9 %      MPV 11 8 fL      Platelets 427 Thousands/uL      nRBC 0 /100 WBCs      Neutrophils Relative 64 %      Immat GRANS % 0 %      Lymphocytes Relative 25 %      Monocytes Relative 9 %      Eosinophils Relative 1 %      Basophils Relative 1 %      Neutrophils Absolute 4 28 Thousands/µL      Immature Grans Absolute 0 03 Thousand/uL      Lymphocytes Absolute 1 67 Thousands/µL      Monocytes Absolute 0 58 Thousand/µL      Eosinophils Absolute 0 09 Thousand/µL      Basophils Absolute 0 04 Thousands/µL                  XR chest 1 view portable   ED Interpretation by Berkley Eduardo PA-C (07/10 1059)   Unremarkable when compared to previous x-ray no changes noted      CT head without contrast   Final Result by Shira , DO (07/10 1030)   Mild cerebral atrophy with chronic small vessel ischemic white matter disease  No acute intracranial abnormality  Workstation performed: [id number]                    Procedures  ECG 12 Lead Documentation Only    Date/Time: 7/10/2021 10:16 AM  Performed by: Anika Carreno PA-C  Authorized by: Anika Carreno PA-C     Indications / Diagnosis:  Htn  ECG reviewed by me, the ED Provider: yes    Patient location:  ED  Previous ECG:     Previous ECG:  Compared to current    Comparison ECG info: May 12, 2021, twave inversion no longer evident in inferior leads     Similarity:  Changes noted    Comparison to cardiac monitor: Yes    Interpretation:     Interpretation: normal    Rate:     ECG rate:  72    ECG rate assessment: normal    Rhythm:     Rhythm: sinus rhythm    Conduction:     Conduction: normal    ST segments:     ST segments:  Normal  T waves:     T waves: normal               ED Course  ED Course as of Jul 10 1208   Sat Jul 10, 2021   1045 Will give 1 gram IV for replacement      Magnesium(!): 1 4   1046 Mild cerebral atrophy with chronic small vessel ischemic white matter disease      No acute intracranial abnormality             1202 Patients headache and symptoms improved                                 SBIRT 20yo+      Most Recent Value   SBIRT (25 yo +)   In order to provide better care to our patients, we are screening all of our patients for alcohol and drug use  Would it be okay to ask you these screening questions? No Filed at: 07/10/2021 1002                    MDM  Number of Diagnoses or Management Options  Elevated blood pressure reading  Lightheadedness  Diagnosis management comments: Patient's lab work was relatively unremarkable magnesium was 1 4 patient was given IV magnesium here for replenishment but also for headache treatment  Patient upon re-evaluation had symptom improvement   Patient was instructed to monitor his blood pressure and return if needed but to follow-up with his cardiologist or his family doctor regarding his blood pressure control  Patient expressed understanding was in agreement treatment plan       Amount and/or Complexity of Data Reviewed  Clinical lab tests: ordered and reviewed  Tests in the radiology section of CPT®: ordered and reviewed  Decide to obtain previous medical records or to obtain history from someone other than the patient: yes  Review and summarize past medical records: yes  Independent visualization of images, tracings, or specimens: yes    Risk of Complications, Morbidity, and/or Mortality  Presenting problems: moderate  Diagnostic procedures: moderate  Management options: moderate    Patient Progress  Patient progress: stable      Disposition  Final diagnoses:   Elevated blood pressure reading   Lightheadedness     Time reflects when diagnosis was documented in both MDM as applicable and the Disposition within this note     Time User Action Codes Description Comment    7/10/2021 12:05 PM Rodrigo Mackey Add [R03 0] Elevated blood pressure reading     7/10/2021 12:05 PM Nupur, [id number] [facility]       ED Disposition     ED Disposition Condition Date/Time Comment    Discharge Stable Sat Jul 10, 2021 12:05 PM zAalea Lawrence discharge to home/self care  Follow-up Information     Follow up With Specialties Details Why Contact Info    Clare Moreno MD Internal Medicine Call in 1 week As needed [id number] [phone]            Patient's Medications   Discharge Prescriptions    No medications on file     No discharge procedures on file      PDMP Review     None          ED Provider  Electronically Signed by           Berkley Eduardo PA-C  07/10/21 4091

## 2021-07-12 LAB
ATRIAL RATE: 72 BPM
P AXIS: 57 DEGREES
PR INTERVAL: 188 MS
QRS AXIS: 39 DEGREES
QRSD INTERVAL: 82 MS
QT INTERVAL: 382 MS
QTC INTERVAL: 418 MS
T WAVE AXIS: 57 DEGREES
VENTRICULAR RATE: 72 BPM

## 2021-07-12 PROCEDURE — 93010 ELECTROCARDIOGRAM REPORT: CPT | Performed by: INTERNAL MEDICINE

## 2021-07-16 ENCOUNTER — TELEPHONE (OUTPATIENT)
Dept: OTHER | Facility: OTHER | Age: 71
End: 2021-07-16

## 2021-07-16 ENCOUNTER — OFFICE VISIT (OUTPATIENT)
Dept: CARDIOLOGY CLINIC | Facility: CLINIC | Age: 71
End: 2021-07-16
Payer: MEDICARE

## 2021-07-16 VITALS
WEIGHT: 249 LBS | DIASTOLIC BLOOD PRESSURE: 80 MMHG | SYSTOLIC BLOOD PRESSURE: 130 MMHG | BODY MASS INDEX: 36.88 KG/M2 | OXYGEN SATURATION: 96 % | HEART RATE: 75 BPM | HEIGHT: 69 IN

## 2021-07-16 DIAGNOSIS — I10 ESSENTIAL HYPERTENSION: ICD-10-CM

## 2021-07-16 DIAGNOSIS — R07.9 CHEST PAIN WITH MODERATE RISK FOR CARDIAC ETIOLOGY: Primary | ICD-10-CM

## 2021-07-16 DIAGNOSIS — E78.2 MIXED HYPERLIPIDEMIA: ICD-10-CM

## 2021-07-16 DIAGNOSIS — E83.42 HYPOMAGNESEMIA: ICD-10-CM

## 2021-07-16 PROCEDURE — 99214 OFFICE O/P EST MOD 30 MIN: CPT | Performed by: NURSE PRACTITIONER

## 2021-07-16 PROCEDURE — 93000 ELECTROCARDIOGRAM COMPLETE: CPT | Performed by: NURSE PRACTITIONER

## 2021-07-16 RX ORDER — PRAVASTATIN SODIUM 40 MG
40 TABLET ORAL DAILY
COMMUNITY

## 2021-07-16 RX ORDER — GLUCOSA SU 2KCL/CHONDROITIN SU 500-400 MG
100 CAPSULE ORAL DAILY
COMMUNITY

## 2021-07-16 RX ORDER — CARVEDILOL 12.5 MG/1
12.5 TABLET ORAL 2 TIMES DAILY WITH MEALS
Qty: 60 TABLET | Refills: 11 | Status: SHIPPED | OUTPATIENT
Start: 2021-07-16 | End: 2021-08-04 | Stop reason: SDUPTHER

## 2021-07-16 RX ORDER — AMLODIPINE BESYLATE 2.5 MG/1
2.5 TABLET ORAL DAILY
Qty: 30 TABLET | Refills: 11 | Status: SHIPPED | OUTPATIENT
Start: 2021-07-16 | End: 2021-08-07 | Stop reason: SDUPTHER

## 2021-07-16 RX ORDER — LISINOPRIL 40 MG/1
40 TABLET ORAL DAILY
Qty: 30 TABLET | Refills: 11 | Status: SHIPPED | OUTPATIENT
Start: 2021-07-16

## 2021-07-16 RX ORDER — AMLODIPINE BESYLATE 5 MG/1
5 TABLET ORAL DAILY
Qty: 30 TABLET | Refills: 11 | Status: SHIPPED | OUTPATIENT
Start: 2021-07-16

## 2021-07-16 NOTE — TELEPHONE ENCOUNTER
Patient called this morning to make an appointment for evaluation of his home blood pressure readings and possible adjustment to medications following a recent ED visit, since his next appointment with Dr Regine Ospina is not until September  Can someone reach out to him to schedule? I also advised he can call office back in a bit to speak with staff directly

## 2021-07-16 NOTE — PATIENT INSTRUCTIONS
Reduce carvedilol to 12 5 twice daily  Keep Amlodipine at 7 5mg daily (however, be sure not to split pill  Take 5 and 2 5mg tablets)  Be sure to hydrate well and continue 2g sodium diet  Keep appt with PCP office for BP check but send results through the portal to me  Please complete labwork in 8 weeks prior to visit with Dr Torin Wan  Please call me with any new or worsening symptoms

## 2021-07-18 PROBLEM — I16.9 HYPERTENSIVE CRISIS: Status: ACTIVE | Noted: 2021-07-18

## 2021-07-18 PROBLEM — I16.9 HYPERTENSIVE CRISIS: Status: RESOLVED | Noted: 2021-07-18 | Resolved: 2021-07-18

## 2021-07-18 PROBLEM — E83.42 HYPOMAGNESEMIA: Status: ACTIVE | Noted: 2021-07-18

## 2021-07-18 NOTE — ASSESSMENT & PLAN NOTE
Pt was experiencing left sided chest pain in association with hypertensive urgency  Nuclear stress test on 5/13/2021 negative for ischemia   Echo 5/13/2021 with EF 55% and no significant valvular abnormalities  BP improving and chest pain is resolved  Reviewed cardiac risks - will continue to work on weight loss  Continue Asa 81mg daily, Rosuvastatin 20mg daily  Noted chest discomfort at ER 7/10/21 as well as earlier today, however, upon my exam, this is consistent with musculoskeletal pain of the left upper pectoral muscle and left shoulder - reproducible with palpation and range of motion  Telemetry monitoring, chest xray, and Troponin 1 levels in ER unrevealing    ECG today in office with normal sinus rhythm  Will continue to monitor closely

## 2021-07-18 NOTE — ASSESSMENT & PLAN NOTE
Magnesium level noted to be 1 4 during ER visit on 7/7/21  Instructed on Magnesium supplementation 250-500mg daily  Will repeat prior to next visit

## 2021-07-18 NOTE — PROGRESS NOTES
Cardiology Follow Up    Donna Schmidt  1950  [id number]  [facility]  [address]  [address]  [phone]    Carmel Gandhi presents today for follow up to [address] [place] [address] ER visit on 7/10/2021 for HTN, dizziness  1  Chest pain with moderate risk for cardiac etiology  Assessment & Plan:  Pt was experiencing left sided chest pain in association with hypertensive urgency  Nuclear stress test on 5/13/2021 negative for ischemia   Echo 5/13/2021 with EF 55% and no significant valvular abnormalities  BP improving and chest pain is resolved  Reviewed cardiac risks - will continue to work on weight loss  Continue Asa 81mg daily, Rosuvastatin 20mg daily  Noted chest discomfort at ER 7/10/21 as well as earlier today, however, upon my exam, this is consistent with musculoskeletal pain of the left upper pectoral muscle and left shoulder - reproducible with palpation and range of motion  Telemetry monitoring, chest xray, and Troponin 1 levels in ER unrevealing  ECG today in office with normal sinus rhythm  Will continue to monitor closely        2  Essential hypertension  Assessment & Plan:  BP in acceptable range on my check  His home cuff is reading about 10 points higher than manual BP taken in my office  Now with symptoms of lightheadedness/dizziness  Continue Amlodipine at 7 5mg daily, however, you may NOT cut these pills in half  Trial dose reduction of Carvedilol to 12 5mg daily  Already scheduled for BP check Monday at PCP office - sent portal message with results  Discussed my concern for potential sleep apnea and the implications of untreated sleep apnea on his health - he states he will consider a sleep study  Follow up as scheduled or sooner PRN    Orders:  -     amLODIPine (NORVASC) 5 mg tablet; Take 1 tablet (5 mg total) by mouth daily Take with 2 5mg to equal 7 5mg daily  -     amLODIPine (NORVASC) 2 5 mg tablet;  Take 1 tablet (2 5 mg total) by mouth daily Take with 5mg to equal 7 5mg daily  -     carvedilol (COREG) 12 5 mg tablet; Take 1 tablet (12 5 mg total) by mouth 2 (two) times a day with meals  -     lisinopril (ZESTRIL) 40 mg tablet; Take 1 tablet (40 mg total) by mouth daily    3  Hypomagnesemia  Assessment & Plan:  Magnesium level noted to be 1 4 during ER visit on 7/7/21  Instructed on Magnesium supplementation 250-500mg daily  Will repeat prior to next visit    Orders:  -     Magnesium; Future; Expected date: 09/16/2021    4  Mixed hyperlipidemia  Assessment & Plan:  Lipids 5/13/21: , TG 62, HDL 49,   Newly started Rosuvastatin 20mg daily  Goal < 100  Will repeat lipid panel prior to next visit -order provided    Orders:  -     Lipid Panel with Direct LDL reflex; Future; Expected date: 09/16/2021     HPI  From previous OV's:  Brandon Greenberg has a history of HTN, HLD, DM type 2      He was hospitalized for chest pain and hypertensive urgency at [facility] on 5/12/21  He underwent echocardiogram showing EF 55%, no RWMA  Mildly increased wall thickness and grade 1 diastolic dysfunction  Nuclear stress test showed no ischemia  Renal artery ultrasound showed no significant renal artery stenosis  His blood pressure did improve with adjustment in his medication with Amlodipine increase from 7 5 to 10mg and Lisinopril increase from 20mg to 40mg  Metoprolol was switched to Carvedilol      He followed up with me on 5/19/2021  He did see his PCP the day prior, who added medication for anxiety  He mentioned leg swelling  She suggested a dose reduction in Amlodipine back to 5mg and increase Carvedilol to 25mg BID  He has not yet made the change as he wished to discuss with our office  He does report resolution of the chest pain  He will intermittently getting facial flushing but that is improving    He does continue to snore and tells me he did have a positive sleep study in the past    His BP remained somewhat elevated at PCP recheck and his Amlodipine was increased back to 7 5mg daily along with Carvedilol increase to 25mg BID        7/1/2021: Since his last visit Cinthya Hamilton has been working hard on lifestyle changes  He has cut sugars, increased activity  He has lost 15 pounds, his sugar improved, his back pain is reduced  He denies chest pain, shortness of breath  Leg swelling is improved  His only complaint is he is noticing some lightheadedness at times  His BP at home is 110's/70's     7/16/2021: Cinthya Hamilton presented to [address] [place] [address] ER on 7/10/2021 due to elevated BP readings at home  He and his PCP had been altering his BP medication and he has been taking Amlodipine 7 5mg (cutting a 5mg tab in half), Carvedilol 25mg BID, and Lisinopril 40mg daily  He reported feeling lightheaded/dizzy/headache x 3 days with systolic readings ranging from 130-170 on his home BP cuff  BP in the ER was 130's-160's/70's  Labs revealed negative Troponin 1 levels and Magnesium of 1 4  Chest xray and CT imaging of his head were unrevealing  ECG with NSR  He was discharged home to follow up with cardiology  Today he reports BP readings at home fluctuate between 110's-60's-140's/80's during the day  He notices he gets dizzy/lightheaded after taking his medications, particularly the carvedilol as he takes this twice daily  He did notice some left anterior chest/left shoulder discomfort  He has ongoing right shoulder problems and overcompensates  He has been lifting objects at his grandson's sporting evens  The left chest/shoulder are tender  He denies lightheadedness at present  He denies diaphoresis, palpitations, shortness of breath, or leg swelling       Medical Problems     Problem List     Chest pain with moderate risk for cardiac etiology    Essential hypertension    Mixed hyperlipidemia    Type 2 diabetes mellitus without complication, without long-term current use of insulin (HCC)       Lab Results   Component Value Date    HGBA1C 7 6 (H) 02/22/2021 Gastroesophageal reflux disease with esophagitis    Hypomagnesemia              Past Medical History:   Diagnosis Date    Diabetes mellitus ([address] )     High cholesterol     Hypertension     Seasonal allergies      Social History     Socioeconomic History    Marital status: Single     Spouse name: Not on file    Number of children: Not on file    Years of education: Not on file    Highest education level: Not on file   Occupational History    Not on file   Tobacco Use    Smoking status: Former Smoker    Smokeless tobacco: Never Used    Tobacco comment: very light past smoking history   Vaping Use    Vaping Use: Never used   Substance and Sexual Activity    Alcohol use: Not Currently    Drug use: Never    Sexual activity: Not on file     Comment: defer   Other Topics Concern    Not on file   Social History Narrative    Not on file     Social Determinants of Health     Financial Resource Strain:     Difficulty of Paying Living Expenses:    Food Insecurity:     Worried About [address] in the Last Year:     [address] in the Last Year:    Transportation Needs:     Lack of Transportation (Medical):      Lack of Transportation (Non-Medical):    Physical Activity:     Days of Exercise per Week:     Minutes of Exercise per Session:    Stress:     Feeling of Stress :    Social Connections:     Frequency of Communication with Friends and Family:     Frequency of Social Gatherings with Friends and Family:     Attends Zoroastrianism Services:     Active Member of Clubs or Organizations:     Attends Club or Organization Meetings:     Marital Status:    Intimate Partner Violence:     Fear of Current or Ex-Partner:     Emotionally Abused:     Physically Abused:     Sexually Abused:       Family History   Problem Relation Age of Onset    Clotting disorder Mother     Alcohol abuse Father     Hypertension Father     Coronary artery disease Sister     Hypertension Sister    Zakia Perez Hypertension Brother     Diabetes Paternal Grandmother      Past Surgical History:   Procedure Laterality Date    FOOT SURGERY Left     great toe    SHOULDER SURGERY Right        Current Outpatient Medications:     amLODIPine (NORVASC) 5 mg tablet, Take 1 tablet (5 mg total) by mouth daily Take with 2 5mg to equal 7 5mg daily, Disp: 30 tablet, Rfl: 11    aspirin (ECOTRIN LOW STRENGTH) 81 mg EC tablet, Take 1 tablet (81 mg total) by mouth daily, Disp:  , Rfl: 0    busPIRone (BUSPAR) 5 mg tablet, Take 5 mg by mouth 2 (two) times a day, Disp: , Rfl:     carvedilol (COREG) 12 5 mg tablet, Take 1 tablet (12 5 mg total) by mouth 2 (two) times a day with meals, Disp: 60 tablet, Rfl: 11    Cholecalciferol (Vitamin D3) 50 MCG (2000 UT) capsule, Take 2,000 Units by mouth daily after lunch, Disp: , Rfl:     Coenzyme Q10 (Co Q10) 100 MG CAPS, Take 100 mg by mouth daily, Disp: , Rfl:     fluticasone (FLONASE) 50 mcg/act nasal spray, 2 sprays into each nostril daily, Disp: , Rfl:     lisinopril (ZESTRIL) 40 mg tablet, Take 1 tablet (40 mg total) by mouth daily, Disp: 30 tablet, Rfl: 11    metFORMIN (GLUCOPHAGE-XR) 500 mg 24 hr tablet, Take 500 mg by mouth every evening, Disp: , Rfl:     omeprazole (PriLOSEC) 20 mg delayed release capsule, Take 40 mg by mouth daily, Disp: , Rfl:     pravastatin (PRAVACHOL) 40 mg tablet, Take 40 mg by mouth daily, Disp: , Rfl:     amLODIPine (NORVASC) 2 5 mg tablet, Take 1 tablet (2 5 mg total) by mouth daily Take with 5mg to equal 7 5mg daily, Disp: 30 tablet, Rfl: 11  No Known Allergies    Labs:     Chemistry        Component Value Date/Time    K 4 2 07/10/2021 1009     07/10/2021 1009    CO2 32 07/10/2021 1009    BUN 12 07/10/2021 1009    CREATININE 0 87 07/10/2021 1009        Component Value Date/Time    CALCIUM 9 4 07/10/2021 1009    ALKPHOS 74 07/10/2021 1009    AST 14 07/10/2021 1009    ALT 32 07/10/2021 1009            No results found for: CHOL  Lab Results   Component Value Date    HDL 49 05/12/2021     Lab Results   Component Value Date    LDLCALC 110 (H) 05/12/2021     Lab Results   Component Value Date    TRIG 62 05/12/2021     Cardiac imaging:  ECG 7/16/2021: normal sinus rhythm, rate 74    Echocardiogram 5/13/2021: EF 55%  [facility]  LV wall thickness mildly increased  Grade 1 diastolic dysfunction  Trace MR  Trace TR      Nuclear stress test 5/13/2021: Sestamibi  No chest pain during study  EF 50%  Image artifact, likely diaphragm attentuation  Normal study      ECG 5/17/2021: normal sinus rhythm, nonspecific ST changes  Rate 75    Review of Systems   Constitutional: Negative  HENT: Negative  Cardiovascular: Positive for chest pain  Negative for dyspnea on exertion, irregular heartbeat, leg swelling, near-syncope, orthopnea, palpitations and syncope  Respiratory: Negative for cough and snoring  Endocrine: Negative  Skin: Negative  Musculoskeletal: Positive for arthritis and joint pain  Gastrointestinal: Negative  Genitourinary: Negative  Neurological: Negative  Psychiatric/Behavioral: Negative  Vitals:    07/16/21 1411   BP: 130/80   Pulse:    SpO2:      Vitals:    07/16/21 1338   Weight: 113 kg (249 lb)     Height: 5' 9" (175 3 cm)   Body mass index is 36 77 kg/m²  Physical Exam  Vitals and nursing note reviewed  Constitutional:       General: He is not in acute distress  Appearance: He is well-developed  He is not diaphoretic  HENT:      Head: Normocephalic and atraumatic  Neck:      Vascular: No carotid bruit or JVD  Cardiovascular:      Rate and Rhythm: Normal rate and regular rhythm  No extrasystoles are present  Pulses: Intact distal pulses  Heart sounds: Normal heart sounds, S1 normal and S2 normal  No murmur heard  No friction rub  No gallop  Comments: Trace lower leg edema, improved from prior visit  Pulmonary:      Effort: Pulmonary effort is normal  No respiratory distress        Breath sounds: Normal breath sounds  Chest:       Abdominal:      General: There is no distension  Palpations: Abdomen is soft  Tenderness: There is no abdominal tenderness  Skin:     General: Skin is warm and dry  Findings: No rash  Neurological:      Mental Status: He is alert and oriented to person, place, and time  Psychiatric:         Mood and Affect: Mood normal          Speech: Speech normal          Behavior: Behavior normal  Behavior is cooperative           Cognition and Memory: Cognition and memory normal

## 2021-07-18 NOTE — ASSESSMENT & PLAN NOTE
Lipids 5/13/21: , TG 62, HDL 49,   Newly started Rosuvastatin 20mg daily  Goal < 100  Will repeat lipid panel prior to next visit -order provided

## 2021-07-18 NOTE — ASSESSMENT & PLAN NOTE
BP in acceptable range on my check  His home cuff is reading about 10 points higher than manual BP taken in my office  Now with symptoms of lightheadedness/dizziness  Continue Amlodipine at 7 5mg daily, however, you may NOT cut these pills in half  Trial dose reduction of Carvedilol to 12 5mg daily  Already scheduled for BP check Monday at PCP office - sent portal message with results  Discussed my concern for potential sleep apnea and the implications of untreated sleep apnea on his health - he states he will consider a sleep study    Follow up as scheduled or sooner PRN

## 2021-07-19 ENCOUNTER — PATIENT MESSAGE (OUTPATIENT)
Dept: CARDIOLOGY CLINIC | Facility: CLINIC | Age: 71
End: 2021-07-19

## 2021-07-19 NOTE — TELEPHONE ENCOUNTER
From: Rigo Rosales  To:  LUIS MANUEL Varghese  Sent: 7/19/2021 9:40 AM EDT  Subject: Non-Urgent Medical Question    Jazlyn Villasenor went for the blood pressure check today at 8:40 it was 128 over 70 started taking the 12 5 on Saturday morning

## 2021-07-21 ENCOUNTER — HOSPITAL ENCOUNTER (OUTPATIENT)
Dept: NON INVASIVE DIAGNOSTICS | Facility: HOSPITAL | Age: 71
Discharge: HOME | End: 2021-07-21
Payer: MEDICARE

## 2021-07-21 DIAGNOSIS — I10 ESSENTIAL HYPERTENSION, MALIGNANT: ICD-10-CM

## 2021-07-21 DIAGNOSIS — I65.23 BILATERAL CAROTID ARTERY OCCLUSION: ICD-10-CM

## 2021-07-21 DIAGNOSIS — E13.49 OTHER DIABETIC NEUROLOGICAL COMPLICATION ASSOCIATED WITH OTHER SPECIFIED DIABETES MELLITUS (HCC): ICD-10-CM

## 2021-07-21 DIAGNOSIS — R42 DIZZINESS AND GIDDINESS: ICD-10-CM

## 2021-07-21 PROCEDURE — 93880 EXTRACRANIAL BILAT STUDY: CPT | Performed by: SURGERY

## 2021-07-21 PROCEDURE — 93880 EXTRACRANIAL BILAT STUDY: CPT

## 2021-07-23 ENCOUNTER — TELEPHONE (OUTPATIENT)
Dept: NON INVASIVE DIAGNOSTICS | Facility: HOSPITAL | Age: 71
End: 2021-07-23

## 2021-07-23 ENCOUNTER — PATIENT MESSAGE (OUTPATIENT)
Dept: CARDIOLOGY CLINIC | Facility: CLINIC | Age: 71
End: 2021-07-23

## 2021-07-23 DIAGNOSIS — R00.2 PALPITATIONS: Primary | ICD-10-CM

## 2021-07-23 NOTE — TELEPHONE ENCOUNTER
Spoke with pt to discuss  He reports that the upper chest pain he was feeling last week is gone  He is now noticing a tightness in the center above his stomach that comes at rest and is associated with a fluttering sensation in his chest  He denies any lightheadedness, shortness of breath with it  He states he can exercise and work in his garden with no symptoms  His BP has been better with resolving lightheadedness with the adjustment in Carvedilol dose  He does still get a morning headache that dissipates by noon  He does snore and has not been checked for sleep apnea  We reviewed his carotid u/s results - <50% stenosis to bilateral carotid arteries  He is on pravastatin and has the order to recheck his lipid panel  Discussed our goal LDL will be < 70  Continue aspirin and BP control  BP at home has been 120's-130's  We will repeat study in 2 years  I am going to order a 48 hour holter monitor  He is agreeable to this  Can you please schedule?

## 2021-07-23 NOTE — TELEPHONE ENCOUNTER
----- Message from Ibis Helms sent at 7/23/2021  8:01 AM EDT -----  Regarding: FW: Prescription Question  Contact: [phone]    ----- Message -----  From: Ellen Parra  Sent: 7/22/2021   3:24 PM EDT  To: [facility] [place] [facility]  Subject: Prescription Question                            Dewayne Getting the chest pains are not going away can it be from one of the medications that I am taking it comes and goes but when it's there it feels like something is pulsating in the center not sure what to do it's more often than before

## 2021-07-23 NOTE — TELEPHONE ENCOUNTER
From: Wilver Gleason  To:  LUIS MANUEL Herrera  Sent: 7/23/2021 11:17 AM EDT  Subject: Non-Urgent Medical Question    Gunner Shafer did u get to look at the Doppler test yet was everything ok thank u

## 2021-07-26 ENCOUNTER — TELEPHONE (OUTPATIENT)
Dept: CARDIOLOGY CLINIC | Facility: CLINIC | Age: 71
End: 2021-07-26

## 2021-07-26 ENCOUNTER — PATIENT MESSAGE (OUTPATIENT)
Dept: CARDIOLOGY CLINIC | Facility: CLINIC | Age: 71
End: 2021-07-26

## 2021-07-26 ENCOUNTER — HOSPITAL ENCOUNTER (EMERGENCY)
Facility: HOSPITAL | Age: 71
Discharge: HOME | End: 2021-07-26
Attending: EMERGENCY MEDICINE
Payer: MEDICARE

## 2021-07-26 ENCOUNTER — APPOINTMENT (EMERGENCY)
Dept: RADIOLOGY | Facility: HOSPITAL | Age: 71
End: 2021-07-26
Payer: MEDICARE

## 2021-07-26 VITALS
RESPIRATION RATE: 18 BRPM | TEMPERATURE: 98.2 F | BODY MASS INDEX: 37.67 KG/M2 | DIASTOLIC BLOOD PRESSURE: 71 MMHG | WEIGHT: 255.07 LBS | SYSTOLIC BLOOD PRESSURE: 156 MMHG | OXYGEN SATURATION: 97 % | HEART RATE: 68 BPM

## 2021-07-26 DIAGNOSIS — R00.2 PALPITATIONS: Primary | ICD-10-CM

## 2021-07-26 LAB
ANION GAP SERPL CALCULATED.3IONS-SCNC: 7 MMOL/L (ref 4–13)
BASOPHILS # BLD AUTO: 0.03 THOUSANDS/ΜL (ref 0–0.1)
BASOPHILS NFR BLD AUTO: 0 % (ref 0–1)
BUN SERPL-MCNC: 14 MG/DL (ref 5–25)
CALCIUM SERPL-MCNC: 9.6 MG/DL (ref 8.3–10.1)
CHLORIDE SERPL-SCNC: 103 MMOL/L (ref 100–108)
CO2 SERPL-SCNC: 29 MMOL/L (ref 21–32)
CREAT SERPL-MCNC: 0.94 MG/DL (ref 0.6–1.3)
EOSINOPHIL # BLD AUTO: 0.1 THOUSAND/ΜL (ref 0–0.61)
EOSINOPHIL NFR BLD AUTO: 1 % (ref 0–6)
ERYTHROCYTE [DISTWIDTH] IN BLOOD BY AUTOMATED COUNT: 12.2 % (ref 11.6–15.1)
GFR SERPL CREATININE-BSD FRML MDRD: 82 ML/MIN/1.73SQ M
GLUCOSE SERPL-MCNC: 122 MG/DL (ref 65–140)
HCT VFR BLD AUTO: 42.2 % (ref 36.5–49.3)
HGB BLD-MCNC: 14.4 G/DL (ref 12–17)
IMM GRANULOCYTES # BLD AUTO: 0.03 THOUSAND/UL (ref 0–0.2)
IMM GRANULOCYTES NFR BLD AUTO: 0 % (ref 0–2)
LYMPHOCYTES # BLD AUTO: 1.6 THOUSANDS/ΜL (ref 0.6–4.47)
LYMPHOCYTES NFR BLD AUTO: 22 % (ref 14–44)
MAGNESIUM SERPL-MCNC: 1.8 MG/DL (ref 1.6–2.6)
MCH RBC QN AUTO: 30.9 PG (ref 26.8–34.3)
MCHC RBC AUTO-ENTMCNC: 34.1 G/DL (ref 31.4–37.4)
MCV RBC AUTO: 91 FL (ref 82–98)
MONOCYTES # BLD AUTO: 0.58 THOUSAND/ΜL (ref 0.17–1.22)
MONOCYTES NFR BLD AUTO: 8 % (ref 4–12)
NEUTROPHILS # BLD AUTO: 4.96 THOUSANDS/ΜL (ref 1.85–7.62)
NEUTS SEG NFR BLD AUTO: 69 % (ref 43–75)
NRBC BLD AUTO-RTO: 0 /100 WBCS
PHOSPHATE SERPL-MCNC: 3 MG/DL (ref 2.3–4.1)
PLATELET # BLD AUTO: 167 THOUSANDS/UL (ref 149–390)
PMV BLD AUTO: 11.5 FL (ref 8.9–12.7)
POTASSIUM SERPL-SCNC: 4.3 MMOL/L (ref 3.5–5.3)
RBC # BLD AUTO: 4.66 MILLION/UL (ref 3.88–5.62)
SODIUM SERPL-SCNC: 139 MMOL/L (ref 136–145)
TROPONIN I SERPL-MCNC: <0.02 NG/ML
TROPONIN I SERPL-MCNC: <0.02 NG/ML
TSH SERPL DL<=0.05 MIU/L-ACNC: 0.52 UIU/ML (ref 0.36–3.74)
WBC # BLD AUTO: 7.3 THOUSAND/UL (ref 4.31–10.16)

## 2021-07-26 PROCEDURE — 99285 EMERGENCY DEPT VISIT HI MDM: CPT

## 2021-07-26 PROCEDURE — 84484 ASSAY OF TROPONIN QUANT: CPT | Performed by: PHYSICIAN ASSISTANT

## 2021-07-26 PROCEDURE — 93005 ELECTROCARDIOGRAM TRACING: CPT

## 2021-07-26 PROCEDURE — 71045 X-RAY EXAM CHEST 1 VIEW: CPT

## 2021-07-26 PROCEDURE — 84100 ASSAY OF PHOSPHORUS: CPT | Performed by: PHYSICIAN ASSISTANT

## 2021-07-26 PROCEDURE — 99285 EMERGENCY DEPT VISIT HI MDM: CPT | Performed by: EMERGENCY MEDICINE

## 2021-07-26 PROCEDURE — 83735 ASSAY OF MAGNESIUM: CPT | Performed by: PHYSICIAN ASSISTANT

## 2021-07-26 PROCEDURE — 36415 COLL VENOUS BLD VENIPUNCTURE: CPT | Performed by: PHYSICIAN ASSISTANT

## 2021-07-26 PROCEDURE — 85025 COMPLETE CBC W/AUTO DIFF WBC: CPT | Performed by: PHYSICIAN ASSISTANT

## 2021-07-26 PROCEDURE — 80048 BASIC METABOLIC PNL TOTAL CA: CPT | Performed by: PHYSICIAN ASSISTANT

## 2021-07-26 PROCEDURE — 84443 ASSAY THYROID STIM HORMONE: CPT | Performed by: PHYSICIAN ASSISTANT

## 2021-07-26 RX ORDER — ASPIRIN 81 MG/1
243 TABLET, CHEWABLE ORAL ONCE
Status: COMPLETED | OUTPATIENT
Start: 2021-07-26 | End: 2021-07-26

## 2021-07-26 RX ADMIN — ASPIRIN 81 MG CHEWABLE TABLET 243 MG: 81 TABLET CHEWABLE at 14:00

## 2021-07-26 NOTE — ED ATTENDING ATTESTATION
7/26/2021  ILeon DO, saw and evaluated the patient  I have discussed the patient with the resident/non-physician practitioner and agree with the resident's/non-physician practitioner's findings, Plan of Care, and MDM as documented in the resident's/non-physician practitioner's note, except where noted  All available labs and Radiology studies were reviewed  I was present for key portions of any procedure(s) performed by the resident/non-physician practitioner and I was immediately available to provide assistance  At this point I agree with the current assessment done in the Emergency Department    I have conducted an independent evaluation of this patient a history and physical

## 2021-07-26 NOTE — TELEPHONE ENCOUNTER
Pt is also scheduled and aware  He is going to reschedule because the date does not work for him  Pt is stating that he is losing more weight  Asking if the metoprolol or the amlodipine could be causing this  Asking if he should cut back

## 2021-07-26 NOTE — ED PROVIDER NOTES
History  Chief Complaint   Patient presents with    Palpitations     began about 1hr ago with palpitations, was sitting on deck when started, denies chest pain, denies SOB     68-year-old male presents emergency department for evaluation of palpitations  Patient states palpitations began approximately 1 hour ago  Reports he was sitting on his deck after doing some yd work when he 1st noticed  States this felt fast and irregular  Reports he did have some left-sided chest pain  Unable to describe this  Denies any radiation  He denies any shortness of breath, diaphoresis, leg swelling  Patient reports since chest pain and palpitations have resolved  Reports he does follow with Cardiology due to chest pain who is currently managing his hypertension  Patient states prior to arrival he took his blood pressure which seem to be high      History provided by:  Patient  Palpitations  Palpitations quality:  Irregular  Onset quality:  Sudden  Duration:  1 hour  Progression:  Resolved  Chronicity:  New  Context: exercise    Context: not anxiety, not appetite suppressants, not blood loss, not bronchodilators, not caffeine, not dehydration, not hyperventilation, not illicit drugs, not nicotine and not stimulant use    Relieved by:  None tried  Worsened by:  Nothing  Ineffective treatments:  None tried  Associated symptoms: chest pain    Associated symptoms: no back pain, no chest pressure, no cough, no diaphoresis, no dizziness, no hemoptysis, no leg pain, no lower extremity edema, no malaise/fatigue, no nausea, no near-syncope, no numbness, no orthopnea, no PND, no shortness of breath, no syncope, no vomiting and no weakness        Prior to Admission Medications   Prescriptions Last Dose Informant Patient Reported? Taking?    Cholecalciferol (Vitamin D3) 50 MCG (2000 UT) capsule   Yes Yes   Sig: Take 2,000 Units by mouth daily after lunch   Coenzyme Q10 (Co Q10) 100 MG CAPS   Yes Yes   Sig: Take 100 mg by mouth daily amLODIPine (NORVASC) 2 5 mg tablet   No Yes   Sig: Take 1 tablet (2 5 mg total) by mouth daily Take with 5mg to equal 7 5mg daily   amLODIPine (NORVASC) 5 mg tablet   No Yes   Sig: Take 1 tablet (5 mg total) by mouth daily Take with 2 5mg to equal 7 5mg daily   aspirin (ECOTRIN LOW STRENGTH) 81 mg EC tablet   No Yes   Sig: Take 1 tablet (81 mg total) by mouth daily   busPIRone (BUSPAR) 5 mg tablet   Yes Yes   Sig: Take 5 mg by mouth daily    carvedilol (COREG) 12 5 mg tablet   No Yes   Sig: Take 1 tablet (12 5 mg total) by mouth 2 (two) times a day with meals   fluticasone (FLONASE) 50 mcg/act nasal spray   Yes Yes   Si sprays into each nostril daily   lisinopril (ZESTRIL) 40 mg tablet   No Yes   Sig: Take 1 tablet (40 mg total) by mouth daily   metFORMIN (GLUCOPHAGE-XR) 500 mg 24 hr tablet   Yes Yes   Sig: Take 500 mg by mouth every evening   omeprazole (PriLOSEC) 20 mg delayed release capsule   Yes Yes   Sig: Take 40 mg by mouth daily   pravastatin (PRAVACHOL) 40 mg tablet   Yes Yes   Sig: Take 40 mg by mouth daily      Facility-Administered Medications: None       Past Medical History:   Diagnosis Date    Diabetes mellitus ([address] )     High cholesterol     Hypertension     Seasonal allergies        Past Surgical History:   Procedure Laterality Date    FOOT SURGERY Left     great toe    SHOULDER SURGERY Right        Family History   Problem Relation Age of Onset    Clotting disorder Mother     Alcohol abuse Father     Hypertension Father     Coronary artery disease Sister     Hypertension Sister     Hypertension Brother     Diabetes Paternal Grandmother      I have reviewed and agree with the history as documented      E-Cigarette/Vaping    E-Cigarette Use Never User      E-Cigarette/Vaping Substances     Social History     Tobacco Use    Smoking status: Former Smoker    Smokeless tobacco: Never Used    Tobacco comment: very light past smoking history   Vaping Use    Vaping Use: Never used Substance Use Topics    Alcohol use: Not Currently    Drug use: Never       Review of Systems   Constitutional: Negative  Negative for appetite change, chills, diaphoresis, fatigue, fever and malaise/fatigue  HENT: Negative  Respiratory: Negative  Negative for cough, hemoptysis and shortness of breath  Cardiovascular: Positive for chest pain and palpitations  Negative for orthopnea, leg swelling, syncope, PND and near-syncope  Gastrointestinal: Negative  Negative for nausea and vomiting  Musculoskeletal: Negative  Negative for back pain  Skin: Negative  Neurological: Negative  Negative for dizziness, weakness and numbness  All other systems reviewed and are negative  Physical Exam  Physical Exam  Vitals and nursing note reviewed  Constitutional:       General: He is not in acute distress  Appearance: Normal appearance  He is obese  He is not ill-appearing, toxic-appearing or diaphoretic  HENT:      Head: Normocephalic and atraumatic  Nose: Nose normal  No congestion or rhinorrhea  Mouth/Throat:      Mouth: Mucous membranes are moist       Pharynx: Oropharynx is clear  No oropharyngeal exudate or posterior oropharyngeal erythema  Eyes:      Extraocular Movements: Extraocular movements intact  Conjunctiva/sclera: Conjunctivae normal       Pupils: Pupils are equal, round, and reactive to light  Cardiovascular:      Rate and Rhythm: Normal rate and regular rhythm  Pulmonary:      Effort: Pulmonary effort is normal  No respiratory distress  Breath sounds: Normal breath sounds  No stridor  No wheezing, rhonchi or rales  Chest:      Chest wall: No tenderness  Abdominal:      General: Abdomen is flat  Bowel sounds are normal  There is no distension  Palpations: Abdomen is soft  Tenderness: There is no abdominal tenderness  There is no guarding  Musculoskeletal:         General: No swelling, tenderness, deformity or signs of injury   Normal range of motion  Cervical back: Normal range of motion and neck supple  Skin:     General: Skin is warm and dry  Capillary Refill: Capillary refill takes less than 2 seconds  Coloration: Skin is not jaundiced  Findings: No bruising, erythema or rash  Neurological:      General: No focal deficit present  Mental Status: He is alert and oriented to person, place, and time  Sensory: No sensory deficit        Gait: Gait normal    Psychiatric:         Mood and Affect: Mood normal          Behavior: Behavior normal          Vital Signs  ED Triage Vitals   Temperature Pulse Respirations Blood Pressure SpO2   07/26/21 1304 07/26/21 1304 07/26/21 1304 07/26/21 1304 07/26/21 1304   98 2 °F (36 8 °C) 91 20 167/79 96 %      Temp Source Heart Rate Source Patient Position - Orthostatic VS BP Location FiO2 (%)   07/26/21 1304 07/26/21 1304 07/26/21 1304 07/26/21 1304 --   Temporal Monitor Lying Right arm       Pain Score       07/26/21 1615       5           Vitals:    07/26/21 1545 07/26/21 1600 07/26/21 1630 07/26/21 1700   BP: 133/75 127/68 149/70 156/71   Pulse: 74 68 72 68   Patient Position - Orthostatic VS:             Visual Acuity      ED Medications  Medications   aspirin chewable tablet 243 mg (243 mg Oral Given 7/26/21 1400)       Diagnostic Studies  Results Reviewed     Procedure Component Value Units Date/Time    Troponin I [[id number]]  (Normal) Collected: 07/26/21 1614    Lab Status: Final result Specimen: Blood from Arm, Right Updated: 07/26/21 1646     Troponin I <0 02 ng/mL     Basic metabolic panel [[id number]] Collected: 07/26/21 1325    Lab Status: Final result Specimen: Blood from Arm, Right Updated: 07/26/21 1357     Sodium 139 mmol/L      Potassium 4 3 mmol/L      Chloride 103 mmol/L      CO2 29 mmol/L      ANION GAP 7 mmol/L      BUN 14 mg/dL      Creatinine 0 94 mg/dL      Glucose 122 mg/dL      Calcium 9 6 mg/dL      eGFR 82 ml/min/1 73sq m     Narrative:      Consolidated [employer] guidelines for Chronic Kidney Disease (CKD):     Stage 1 with normal or high GFR (GFR > 90 mL/min/1 73 square meters)    Stage 2 Mild CKD (GFR = 60-89 mL/min/1 73 square meters)    Stage 3A Moderate CKD (GFR = 45-59 mL/min/1 73 square meters)    Stage 3B Moderate CKD (GFR = 30-44 mL/min/1 73 square meters)    Stage 4 Severe CKD (GFR = 15-29 mL/min/1 73 square meters)    Stage 5 End Stage CKD (GFR <15 mL/min/1 73 square meters)  Note: GFR calculation is accurate only with a steady state creatinine    TSH [[id number]]  (Normal) Collected: 07/26/21 1325    Lab Status: Final result Specimen: Blood from Arm, Right Updated: 07/26/21 1357     TSH 3RD GENERATON 0 522 uIU/mL     Narrative:      Patients undergoing fluorescein dye angiography may retain small amounts of fluorescein in the body for 48-72 hours post procedure  Samples containing fluorescein can produce falsely depressed TSH values  If the patient had this procedure,a specimen should be resubmitted post fluorescein clearance        Phosphorus [[id number]]  (Normal) Collected: 07/26/21 1325    Lab Status: Final result Specimen: Blood from Arm, Right Updated: 07/26/21 1357     Phosphorus 3 0 mg/dL     Magnesium [[id number]]  (Normal) Collected: 07/26/21 1325    Lab Status: Final result Specimen: Blood from Arm, Right Updated: 07/26/21 1357     Magnesium 1 8 mg/dL     Troponin I [[id number]]  (Normal) Collected: 07/26/21 1325    Lab Status: Final result Specimen: Blood from Arm, Right Updated: 07/26/21 1354     Troponin I <0 02 ng/mL     CBC and differential [[id number]] Collected: 07/26/21 1325    Lab Status: Final result Specimen: Blood from Arm, Right Updated: 07/26/21 1334     WBC 7 30 Thousand/uL      RBC 4 66 Million/uL      Hemoglobin 14 4 g/dL      Hematocrit 42 2 %      MCV 91 fL      MCH 30 9 pg      MCHC 34 1 g/dL      RDW 12 2 %      MPV 11 5 fL      Platelets 114 Thousands/uL      nRBC 0 /100 WBCs      Neutrophils Relative 69 %      Immat GRANS % 0 %      Lymphocytes Relative 22 %      Monocytes Relative 8 %      Eosinophils Relative 1 %      Basophils Relative 0 %      Neutrophils Absolute 4 96 Thousands/µL      Immature Grans Absolute 0 03 Thousand/uL      Lymphocytes Absolute 1 60 Thousands/µL      Monocytes Absolute 0 58 Thousand/µL      Eosinophils Absolute 0 10 Thousand/µL      Basophils Absolute 0 03 Thousands/µL                  XR chest 1 view portable   ED Interpretation by Elzbieta Dukes DO (07/26 6911)   NAD      Final Result by Felicia Zurita MD (07/26 0773)   No acute cardiopulmonary disease  Findings are stable            Workstation performed: [id number]                    Procedures  ECG 12 Lead Documentation Only    Date/Time: 7/26/2021 1:06 PM  Performed by: Jailene Arzola PA-C  Authorized by: Jailene Arzola PA-C     Indications / Diagnosis:  Palpitation  ECG reviewed by me, the ED Provider: yes    Patient location:  ED  Interpretation:     Interpretation: non-specific    Rate:     ECG rate:  87    ECG rate assessment: normal    Rhythm:     Rhythm: sinus rhythm    Ectopy:     Ectopy: none    QRS:     QRS axis:  Normal    QRS intervals:  Normal  Conduction:     Conduction: normal    ST segments:     ST segments:  Normal  T waves:     T waves: normal               ED Course  ED Course as of Jul 26 1821   Mon Jul 26, 2021   1359 Laboratory findings unremarkable      1520 Heart score 3      1641 Chest xray: No acute cardiopulmonary disease          1647 Repeat troponin negative      1650 I discussed results and findings with patient  He had no recurrent chest pain in the emergency department  We discussed symptomatic treatment at home and symptoms that require prompt return to the ED for further evaluation patient verbalized understanding    He will follow-up with his cardiologist                 HEART Risk Score      Most Recent Value   Heart Score Risk Calculator   History  0 Filed at: 07/26/2021 1520 ECG  0 Filed at: 07/26/2021 1520   Age  2 Filed at: 07/26/2021 1520   Risk Factors  1 Filed at: 07/26/2021 1520   Troponin  0 Filed at: 07/26/2021 1520   HEART Score  3 Filed at: 07/26/2021 1520                      SBIRT 22yo+      Most Recent Value   SBIRT (25 yo +)   In order to provide better care to our patients, we are screening all of our patients for alcohol and drug use  Would it be okay to ask you these screening questions? Yes Filed at: 07/26/2021 1308   Initial Alcohol Screen: US AUDIT-C    1  How often do you have a drink containing alcohol?  0 Filed at: 07/26/2021 1308   2  How many drinks containing alcohol do you have on a typical day you are drinking? 0 Filed at: 07/26/2021 1308   3a  Male UNDER 65: How often do you have five or more drinks on one occasion? 0 Filed at: 07/26/2021 1308   3b  FEMALE Any Age, or MALE 65+: How often do you have 4 or more drinks on one occassion? 0 Filed at: 07/26/2021 1308   Audit-C Score  0 Filed at: 07/26/2021 1308   CARMELA: How many times in the past year have you    Used an illegal drug or used a prescription medication for non-medical reasons? Never Filed at: 07/26/2021 1308                    MDM  Number of Diagnoses or Management Options  Palpitations: new and requires workup  Diagnosis management comments: 73year old male presents emergency department for evaluation of palpitations onset today completed with chest pain  Chest pain resolved prior to arrival   Vitals and medical record reviewed  Physical exam unremarkable  Heart regular rhythm  Lungs clear auscultation  Patient resting comfortably  EKG nonischemic, normal rhythm  Laboratory findings unremarkable  Troponin negative x2  No electrolyte abnormalities  TSH normal   Chest x-ray negative for acute cardiopulmonary findings  I discussed all results and findings with the patient    We discussed symptomatic treatment at home and symptoms that require prompt return to the ED for further evaluation patient verbalized understanding  He will follow-up with cardiology  Clinically and hemodynamically stable for discharge       Amount and/or Complexity of Data Reviewed  Tests in the radiology section of CPT®: ordered and reviewed  Review and summarize past medical records: yes  Independent visualization of images, tracings, or specimens: yes        Disposition  Final diagnoses:   Palpitations     Time reflects when diagnosis was documented in both MDM as applicable and the Disposition within this note     Time User Action Codes Description Comment    7/26/2021  4:48 PM Liquiteria  Add [R00 2] Palpitations       ED Disposition     ED Disposition Condition Date/Time Comment    Discharge Stable Mon Jul 26, 2021  4:48 PM Davis Alfred discharge to home/self care              Follow-up Information     Follow up With Specialties Details Why Contact Info    Shay Iglesias MD Internal Medicine   [address] [place] [address], [address] [place] [address] Cardiology   [address] [place] [address]  [address]  [address] [place] [address] 500 [id number] [phone]            Discharge Medication List as of 7/26/2021  4:50 PM      CONTINUE these medications which have NOT CHANGED    Details   !! amLODIPine (NORVASC) 2 5 mg tablet Take 1 tablet (2 5 mg total) by mouth daily Take with 5mg to equal 7 5mg daily, Starting Fri 7/16/2021, Normal      !! amLODIPine (NORVASC) 5 mg tablet Take 1 tablet (5 mg total) by mouth daily Take with 2 5mg to equal 7 5mg daily, Starting Fri 7/16/2021, Normal      aspirin (ECOTRIN LOW STRENGTH) 81 mg EC tablet Take 1 tablet (81 mg total) by mouth daily, Starting Fri 5/14/2021, No Print      busPIRone (BUSPAR) 5 mg tablet Take 5 mg by mouth daily , Historical Med      carvedilol (COREG) 12 5 mg tablet Take 1 tablet (12 5 mg total) by mouth 2 (two) times a day with meals, Starting Fri 7/16/2021, Normal      Cholecalciferol (Vitamin D3) 50 MCG (2000 UT) capsule Take 2,000 Units by mouth daily after lunch, Starting Wed 4/21/2021, Historical Med      Coenzyme Q10 (Co Q10) 100 MG CAPS Take 100 mg by mouth daily, Historical Med      fluticasone (FLONASE) 50 mcg/act nasal spray 2 sprays into each nostril daily, Historical Med      lisinopril (ZESTRIL) 40 mg tablet Take 1 tablet (40 mg total) by mouth daily, Starting Fri 7/16/2021, Normal      metFORMIN (GLUCOPHAGE-XR) 500 mg 24 hr tablet Take 500 mg by mouth every evening, Starting Sun 3/7/2021, Historical Med      omeprazole (PriLOSEC) 20 mg delayed release capsule Take 40 mg by mouth daily, Starting Wed 3/17/2021, Historical Med      pravastatin (PRAVACHOL) 40 mg tablet Take 40 mg by mouth daily, Historical Med       !! - Potential duplicate medications found  Please discuss with provider  No discharge procedures on file      PDMP Review     None          ED Provider  Electronically Signed by           Preston Navarro PA-C  07/26/21 2483

## 2021-07-26 NOTE — TELEPHONE ENCOUNTER
I do not believe the Carvedilol or Amlodipine are the cause for weight loss  He had been making some significant lifestyle adjustments, which are more likely  I would not adjust medication if BP staying the same, we can continue to pull back if BP lowering

## 2021-07-26 NOTE — TELEPHONE ENCOUNTER
Regarding: Non-Urgent Medical Question  Contact: [phone]  ----- Message from Rhapsodykanwal [employer] sent at 7/26/2021 12:51 PM EDT -----       ----- Message from Melvina Proctor [facility] sent at 7/26/2021 12:17 PM -----   Blanco Spillers about ten minutes ago I started getting that pulsating in my chest checked my blood pressure my machine said I was out of rhythm lasted about 8 minutes pressure was high it is now coming down should I go to the Er

## 2021-07-26 NOTE — TELEPHONE ENCOUNTER
From: Cuauhtemoc Born  To:  LUIS MANUEL Montgomery  Sent: 7/26/2021 10:08 AM EDT  Subject: Non-Urgent Medical Question    I didn't say the blood pressure medicine was causing the weight loss I have bin watching what I eat my question was at 112 over 70 I just feel I'm to low

## 2021-07-26 NOTE — DISCHARGE INSTRUCTIONS
If you have any new or worsening symptoms please return to the ED for continued care  Please follow up with your cardiologist

## 2021-07-26 NOTE — TELEPHONE ENCOUNTER
Per Marily James, called pt to come in for EKG  Pt states he was 1/4 mile away from ER and prefers going there instead due to severity of sx's

## 2021-07-28 LAB
ATRIAL RATE: 87 BPM
P AXIS: 50 DEGREES
PR INTERVAL: 174 MS
QRS AXIS: -12 DEGREES
QRSD INTERVAL: 80 MS
QT INTERVAL: 374 MS
QTC INTERVAL: 450 MS
T WAVE AXIS: 34 DEGREES
VENTRICULAR RATE: 87 BPM

## 2021-08-02 ENCOUNTER — TELEPHONE (OUTPATIENT)
Dept: CARDIOLOGY CLINIC | Facility: CLINIC | Age: 71
End: 2021-08-02

## 2021-08-02 NOTE — TELEPHONE ENCOUNTER
Pt states that he thinks that the carvedolol or the amlodipine is causing stomach pain  Sounds like acid reflux  States that it feels like he has a build up that goes up his chest  States that he is taking omeprazole  He is also taking gaviscon

## 2021-08-02 NOTE — TELEPHONE ENCOUNTER
Please ensure he is taking with food  May use the Gaviscon to relieve gas  Agree with Omeprazole   We can review in detail at his upcoming appt

## 2021-08-04 DIAGNOSIS — I10 ESSENTIAL HYPERTENSION: ICD-10-CM

## 2021-08-04 RX ORDER — CARVEDILOL 12.5 MG/1
12.5 TABLET ORAL 2 TIMES DAILY WITH MEALS
Qty: 60 TABLET | Refills: 0 | Status: SHIPPED | OUTPATIENT
Start: 2021-08-04

## 2021-08-07 DIAGNOSIS — I10 ESSENTIAL HYPERTENSION: ICD-10-CM

## 2021-08-09 RX ORDER — AMLODIPINE BESYLATE 2.5 MG/1
2.5 TABLET ORAL DAILY
Qty: 30 TABLET | Refills: 0 | Status: SHIPPED | OUTPATIENT
Start: 2021-08-09

## 2021-08-11 ENCOUNTER — OFFICE VISIT (OUTPATIENT)
Dept: CARDIOLOGY CLINIC | Facility: CLINIC | Age: 71
End: 2021-08-11
Payer: MEDICARE

## 2021-08-11 VITALS
SYSTOLIC BLOOD PRESSURE: 130 MMHG | HEART RATE: 90 BPM | HEIGHT: 69 IN | OXYGEN SATURATION: 96 % | DIASTOLIC BLOOD PRESSURE: 82 MMHG | WEIGHT: 244 LBS | BODY MASS INDEX: 36.14 KG/M2

## 2021-08-11 DIAGNOSIS — R07.9 CHEST PAIN WITH MODERATE RISK FOR CARDIAC ETIOLOGY: Primary | ICD-10-CM

## 2021-08-11 DIAGNOSIS — E78.2 MIXED HYPERLIPIDEMIA: ICD-10-CM

## 2021-08-11 DIAGNOSIS — E83.42 HYPOMAGNESEMIA: ICD-10-CM

## 2021-08-11 DIAGNOSIS — I10 ESSENTIAL HYPERTENSION: ICD-10-CM

## 2021-08-11 DIAGNOSIS — R00.2 PALPITATIONS: ICD-10-CM

## 2021-08-11 PROCEDURE — 99214 OFFICE O/P EST MOD 30 MIN: CPT | Performed by: NURSE PRACTITIONER

## 2021-08-11 NOTE — PROGRESS NOTES
Cardiology Follow Up    Usman Worthy  1950  [id number]  [facility]  [address]  [address] [place] [employer] [id number] [phone]    Sima Raoms presents today for ER follow up for palpitations  1  Chest pain with moderate risk for cardiac etiology  Assessment & Plan:  Pt was experiencing left sided chest pain in association with hypertensive urgency  Nuclear stress test on 5/13/2021 negative for ischemia   Echo 5/13/2021 with EF 55% and no significant valvular abnormalities  BP improving and chest pain is resolved  Reviewed cardiac risks - will continue to work on weight loss  Continue Asa 81mg daily, Rosuvastatin 20mg daily  Noted chest discomfort at ER 7/10/21 that was consistent with musculoskeletal pain of the left upper pectoral muscle and left shoulder - reproducible with palpation and range of motion  Telemetry monitoring, chest xray, and Troponin levels in ER unrevealing  No recurrent pain  Will continue to monitor  2  Essential hypertension  Assessment & Plan:  BP in acceptable range on my check  His home cuff is reading about 10 points higher than manual BP taken in my office  Resolved lightheadedness/dizziness with adjustments to BP medication  Continue Amlodipine at 7 5mg daily, Carvedilol 12 5mg twice daily, and Lisinopril 40mg daily  Discussed my concern for potential sleep apnea and the implications of untreated sleep apnea on his health - he states he will consider a sleep study  Follow up as scheduled or sooner PRN      3  Mixed hyperlipidemia  Assessment & Plan:  Lipids 5/13/21: , TG 62, HDL 49,   Newly started Rosuvastatin 20mg daily  Goal < 100  Will repeat lipid panel prior to next visit -order provided      4  Hypomagnesemia  Assessment & Plan:  Magnesium level noted to be 1 4 during ER visit on 7/7/21, again 1 8 on 7/26/21  Instructed on Magnesium supplementation 250-500mg daily        5  Palpitations  Assessment & Plan:  New complaint  ECG monitoring in ER unrevealing  Scheduled today for 24 hour holter monitoring  Suspect a component of esophageal spasms as he is dealing with uncontrolled GERD at present  Recommend addition of Magnesium 250-500mg daily       HPI  From previous OV's:  Karthik has a history of HTN, HLD, DM type 2      He was hospitalized for chest pain and hypertensive urgency at [facility] on 5/12/21  He underwent echocardiogram showing EF 55%, no RWMA  Mildly increased wall thickness and grade 1 diastolic dysfunction  Nuclear stress test showed no ischemia  Renal artery ultrasound showed no significant renal artery stenosis  His blood pressure did improve with adjustment in his medication with Amlodipine increase from 7 5 to 10mg and Lisinopril increase from 20mg to 40mg  Metoprolol was switched to Carvedilol      He followed up with me on 5/19/2021  He did see his PCP the day prior, who added medication for anxiety  He mentioned leg swelling  She suggested a dose reduction in Amlodipine back to 5mg and increase Carvedilol to 25mg BID  He has not yet made the change as he wished to discuss with our office  He does report resolution of the chest pain  He will intermittently getting facial flushing but that is improving  He does continue to snore and tells me he did have a positive sleep study in the past    His BP remained somewhat elevated at PCP recheck and his Amlodipine was increased back to 7 5mg daily along with Carvedilol increase to 25mg BID        7/1/2021: Since his last visit Dagoberto Johnson has been working hard on lifestyle changes  He has cut sugars, increased activity  He has lost 15 pounds, his sugar improved, his back pain is reduced  He denies chest pain, shortness of breath  Leg swelling is improved  His only complaint is he is noticing some lightheadedness at times   His BP at home is 110's/70's      7/16/2021: Dagoberto Johnson presented to [address] [place] [address] on 7/10/2021 due to elevated BP readings at home  He and his PCP had been altering his BP medication and he has been taking Amlodipine 7 5mg (cutting a 5mg tab in half), Carvedilol 25mg BID, and Lisinopril 40mg daily  He reported feeling lightheaded/dizzy/headache x 3 days with systolic readings ranging from 130-170 on his home BP cuff  BP in the ER was 130's-160's/70's  Labs revealed negative Troponin 1 levels and Magnesium of 1 4  Chest xray and CT imaging of his head were unrevealing  ECG with NSR  He was discharged home to follow up with cardiology      Today he reports BP readings at home fluctuate between 110's-60's-140's/80's during the day  He notices he gets dizzy/lightheaded after taking his medications, particularly the carvedilol as he takes this twice daily  He did notice some left anterior chest/left shoulder discomfort  He has ongoing right shoulder problems and overcompensates  He has been lifting objects at his grandson's sporting evens  The left chest/shoulder are tender  He denies lightheadedness at present  He denies diaphoresis, palpitations, shortness of breath, or leg swelling      8/11/2021: Ivanna Loaiza presents today for follow up after his recent visit to [address] [place] [address] on 7/26/2021 for palpitations  He tells me he had been bent over weeding and felt fine during the activity but when he finished and sat down and noticed a fluttering sensation in his mid-sternal region  This concerned him and he came to the ER to be examined  He tells me he continued to feel sensation of palpitations in the ER, however EKG/telemetry monitoring was unrevealing  Troponin levels were negative  Magnesium was 1 8  TSH was WNL  He has continued to work with GI regarding his uncontrolled GERD  He did have an EGD positive for Wylie's esophagus  No strictures were found at that time  GI increased his Omeprazole to BID dosing and added Gaviscon for gas   He notes a sensation of fullness in his upper esophagus and throat that relieves with belching  He does find the Carvedilol constipates him and has been taking a stool softener  He denies any lightheadedness, chest pain, leg swelling, or shortness of breath  He did get an episode yesterday with a dry cough, but that has since resolved  He continues with weight loss efforts and is down another 10 pounds from his last visit  He tells me his blood pressures have been very well controlled at home  Medical Problems     Problem List     Chest pain with moderate risk for cardiac etiology    Essential hypertension    Mixed hyperlipidemia    Type 2 diabetes mellitus without complication, without long-term current use of insulin ([facility])      Lab Results   Component Value Date    HGBA1C 7 6 (H) 02/22/2021         Gastroesophageal reflux disease with esophagitis    Hypomagnesemia              Past Medical History:   Diagnosis Date    Diabetes mellitus ([facility] [address] )     High cholesterol     Hypertension     Seasonal allergies      Social History     Socioeconomic History    Marital status: Single     Spouse name: Not on file    Number of children: Not on file    Years of education: Not on file    Highest education level: Not on file   Occupational History    Not on file   Tobacco Use    Smoking status: Former Smoker    Smokeless tobacco: Never Used    Tobacco comment: very light past smoking history   Vaping Use    Vaping Use: Never used   Substance and Sexual Activity    Alcohol use: Not Currently    Drug use: Never    Sexual activity: Not on file     Comment: defer   Other Topics Concern    Not on file   Social History Narrative    Not on file     Social Determinants of Health     Financial Resource Strain:     Difficulty of Paying Living Expenses:    Food Insecurity:     Worried About Running Out of Food in the Last Year:     Ran Out of Food in the Last Year:    Transportation Needs:     Lack of Transportation (Medical):      Lack of Transportation (Non-Medical):    Physical Activity:     Days of Exercise per Week:     Minutes of Exercise per Session:    Stress:     Feeling of Stress :    Social Connections:     Frequency of Communication with Friends and Family:     Frequency of Social Gatherings with Friends and Family:     Attends Confucianist Services:     Active Member of Clubs or Organizations:     Attends Club or Organization Meetings:     Marital Status:    Intimate Partner Violence:     Fear of Current or Ex-Partner:     Emotionally Abused:     Physically Abused:     Sexually Abused:       Family History   Problem Relation Age of Onset    Clotting disorder Mother     Alcohol abuse Father     Hypertension Father     Coronary artery disease Sister     Hypertension Sister     Hypertension Brother     Diabetes Paternal Grandmother      Past Surgical History:   Procedure Laterality Date    FOOT SURGERY Left     great toe    SHOULDER SURGERY Right        Current Outpatient Medications:     amLODIPine (NORVASC) 2 5 mg tablet, Take 1 tablet (2 5 mg total) by mouth daily Take with 5mg to equal 7 5mg daily, Disp: 30 tablet, Rfl: 0    amLODIPine (NORVASC) 5 mg tablet, Take 1 tablet (5 mg total) by mouth daily Take with 2 5mg to equal 7 5mg daily, Disp: 30 tablet, Rfl: 11    aspirin (ECOTRIN LOW STRENGTH) 81 mg EC tablet, Take 1 tablet (81 mg total) by mouth daily, Disp:  , Rfl: 0    busPIRone (BUSPAR) 5 mg tablet, Take 5 mg by mouth daily , Disp: , Rfl:     carvedilol (COREG) 12 5 mg tablet, Take 1 tablet (12 5 mg total) by mouth 2 (two) times a day with meals, Disp: 60 tablet, Rfl: 0    Cholecalciferol (Vitamin D3) 50 MCG (2000 UT) capsule, Take 2,000 Units by mouth daily after lunch, Disp: , Rfl:     Coenzyme Q10 (Co Q10) 100 MG CAPS, Take 100 mg by mouth daily, Disp: , Rfl:     fluticasone (FLONASE) 50 mcg/act nasal spray, 2 sprays into each nostril daily, Disp: , Rfl:     lisinopril (ZESTRIL) 40 mg tablet, Take 1 tablet (40 mg total) by mouth daily, Disp: 30 tablet, Rfl: 11   metFORMIN (GLUCOPHAGE-XR) 500 mg 24 hr tablet, Take 500 mg by mouth every evening, Disp: , Rfl:     omeprazole (PriLOSEC) 20 mg delayed release capsule, Take 40 mg by mouth daily, Disp: , Rfl:     pravastatin (PRAVACHOL) 40 mg tablet, Take 40 mg by mouth daily, Disp: , Rfl:   No Known Allergies    Labs:     Chemistry        Component Value Date/Time    K 4 3 07/26/2021 1325     07/26/2021 1325    CO2 29 07/26/2021 1325    BUN 14 07/26/2021 1325    CREATININE 0 94 07/26/2021 1325        Component Value Date/Time    CALCIUM 9 6 07/26/2021 1325    ALKPHOS 74 07/10/2021 1009    AST 14 07/10/2021 1009    ALT 32 07/10/2021 1009            No results found for: CHOL  Lab Results   Component Value Date    HDL 49 05/12/2021     Lab Results   Component Value Date    LDLCALC 110 (H) 05/12/2021     Lab Results   Component Value Date    TRIG 62 05/12/2021     Cardiac imaging:  ECG 7/16/2021: normal sinus rhythm, rate 74     Echocardiogram 5/13/2021: EF 55%  [address] [facility]  LV wall thickness mildly increased  Grade 1 diastolic dysfunction  Trace MR  Trace TR      Nuclear stress test 5/13/2021: Sestamibi  No chest pain during study  EF 50%  Image artifact, likely diaphragm attentuation  Normal study      ECG 5/17/2021: normal sinus rhythm, nonspecific ST changes  Rate 75    Review of Systems   Constitutional: Negative  HENT: Negative  Cardiovascular: Positive for palpitations  Negative for chest pain, dyspnea on exertion, irregular heartbeat, leg swelling, near-syncope, orthopnea and syncope  Respiratory: Negative for cough, shortness of breath and snoring  Endocrine: Negative  Skin: Negative  Musculoskeletal: Negative  Gastrointestinal: Negative  Genitourinary: Negative  Neurological: Negative  Psychiatric/Behavioral: Negative          Vitals:    08/11/21 0930   BP: 130/82   Pulse:    SpO2:      Vitals:    08/11/21 0841   Weight: 111 kg (244 lb)     Height: 5' 9" (175 3 cm)   Body mass index is 36 03 kg/m²  Physical Exam  Vitals and nursing note reviewed  Constitutional:       General: He is not in acute distress  Appearance: He is well-developed  He is not diaphoretic  HENT:      Head: Normocephalic and atraumatic  Neck:      Vascular: No carotid bruit or JVD  Cardiovascular:      Rate and Rhythm: Normal rate and regular rhythm  No extrasystoles are present  Pulses: Intact distal pulses  Heart sounds: Normal heart sounds, S1 normal and S2 normal  No murmur heard  No friction rub  No gallop  Comments: No lower leg edema on exam  Pulmonary:      Effort: Pulmonary effort is normal  No respiratory distress  Breath sounds: Normal breath sounds  Abdominal:      General: There is no distension  Palpations: Abdomen is soft  Tenderness: There is no abdominal tenderness  Skin:     General: Skin is warm and dry  Findings: No rash  Neurological:      Mental Status: He is alert and oriented to person, place, and time  Psychiatric:         Mood and Affect: Mood normal          Speech: Speech normal          Behavior: Behavior normal  Behavior is cooperative           Cognition and Memory: Cognition normal

## 2021-08-11 NOTE — ASSESSMENT & PLAN NOTE
Pt was experiencing left sided chest pain in association with hypertensive urgency  Nuclear stress test on 5/13/2021 negative for ischemia   Echo 5/13/2021 with EF 55% and no significant valvular abnormalities  BP improving and chest pain is resolved  Reviewed cardiac risks - will continue to work on weight loss  Continue Asa 81mg daily, Rosuvastatin 20mg daily  Noted chest discomfort at ER 7/10/21 that was consistent with musculoskeletal pain of the left upper pectoral muscle and left shoulder - reproducible with palpation and range of motion  Telemetry monitoring, chest xray, and Troponin levels in ER unrevealing  No recurrent pain  Will continue to monitor

## 2021-08-11 NOTE — ASSESSMENT & PLAN NOTE
Magnesium level noted to be 1 4 during ER visit on 7/7/21, again 1 8 on 7/26/21  Instructed on Magnesium supplementation 250-500mg daily

## 2021-08-11 NOTE — ASSESSMENT & PLAN NOTE
New complaint  ECG monitoring in ER unrevealing  Scheduled today for 24 hour holter monitoring  Suspect a component of esophageal spasms as he is dealing with uncontrolled GERD at present    Recommend addition of Magnesium 250-500mg daily

## 2021-08-11 NOTE — PATIENT INSTRUCTIONS
Recommend adding Magnesium 250mg daily  I will reach out with the results of your holter monitor  I do suspect you have having some esophageal spasms as well  Continue with GI  Notify our office with any new or worsening symptoms

## 2021-08-11 NOTE — ASSESSMENT & PLAN NOTE
BP in acceptable range on my check  His home cuff is reading about 10 points higher than manual BP taken in my office  Resolved lightheadedness/dizziness with adjustments to BP medication  Continue Amlodipine at 7 5mg daily, Carvedilol 12 5mg twice daily, and Lisinopril 40mg daily  Discussed my concern for potential sleep apnea and the implications of untreated sleep apnea on his health - he states he will consider a sleep study    Follow up as scheduled or sooner PRN

## 2021-08-15 ENCOUNTER — TELEPHONE (OUTPATIENT)
Dept: OTHER | Facility: OTHER | Age: 71
End: 2021-08-15

## 2021-08-15 NOTE — TELEPHONE ENCOUNTER
Pt would like to make the office aware that he was seen at the ER yesterday for irregular heart beat

## 2021-08-16 ENCOUNTER — PATIENT MESSAGE (OUTPATIENT)
Dept: CARDIOLOGY CLINIC | Facility: CLINIC | Age: 71
End: 2021-08-16

## 2021-08-16 NOTE — TELEPHONE ENCOUNTER
Attempted to reach pt at home and mobile number  Left messages to call back  I will also reply through the portal   If pt continues to have leg swelling I will order doppler

## 2021-08-17 NOTE — TELEPHONE ENCOUNTER
Pt states that he is seeing his PCP today and is going to mention it to her  States that he does still have the swelling and the burning   States that he is just really concerned about his D-dimer being up and the fact that he was told that he has skipped beats, because he has never had that in the past

## 2024-09-26 ENCOUNTER — TELEPHONE (OUTPATIENT)
Age: 74
End: 2024-09-26

## 2024-09-26 NOTE — TELEPHONE ENCOUNTER
"Patient called to establish care with Dr D'Amico in  for a renal mass. Pt is scheduled on 10/23 at 1:45 PM. Pt states Dr was highly recommended by a friend.    Pt was seen in [facility] due to gross hematuria, and passing clots. Pt had Ct Scan and labs and was told that he has a renal mass. Pt states he also has an renal cyst that \"hasn't changed\" from a previous scan pt had done some time ago.    Pt states he still experiences blood in urine \"once in a while\" but no longer passing clots.    Pt is scheduled for an MRI at the same Hospital on 10/1.    Call back if needed [phone]  "

## 2024-10-03 ENCOUNTER — TELEPHONE (OUTPATIENT)
Dept: UROLOGY | Facility: CLINIC | Age: 74
End: 2024-10-03

## 2024-10-03 ENCOUNTER — APPOINTMENT (OUTPATIENT)
Dept: LAB | Facility: CLINIC | Age: 74
End: 2024-10-03
Payer: MEDICARE

## 2024-10-03 DIAGNOSIS — N28.89 RENAL MASS: ICD-10-CM

## 2024-10-03 DIAGNOSIS — R35.1 NOCTURIA: Primary | ICD-10-CM

## 2024-10-03 DIAGNOSIS — R35.1 NOCTURIA: ICD-10-CM

## 2024-10-03 LAB — PSA SERPL-MCNC: 2.71 NG/ML (ref 0–4)

## 2024-10-03 PROCEDURE — 36415 COLL VENOUS BLD VENIPUNCTURE: CPT

## 2024-10-03 PROCEDURE — 84153 ASSAY OF PSA TOTAL: CPT

## 2024-10-03 RX ORDER — ESOMEPRAZOLE MAGNESIUM 40 MG/1
40 CAPSULE, DELAYED RELEASE ORAL
COMMUNITY
Start: 2024-09-17

## 2024-10-03 RX ORDER — ROSUVASTATIN CALCIUM 40 MG/1
40 TABLET, COATED ORAL EVERY EVENING
COMMUNITY
Start: 2024-09-17

## 2024-10-03 RX ORDER — TORSEMIDE 10 MG/1
10 TABLET ORAL DAILY
COMMUNITY
Start: 2024-09-17

## 2024-10-03 RX ORDER — CYCLOBENZAPRINE HCL 10 MG
10 TABLET ORAL 3 TIMES DAILY PRN
COMMUNITY
Start: 2024-09-06

## 2024-10-03 RX ORDER — SUCRALFATE 1 G/1
1 TABLET ORAL 4 TIMES DAILY
COMMUNITY
Start: 2024-08-22

## 2024-10-03 NOTE — TELEPHONE ENCOUNTER
Spoke with pt, he will get the MRI from [facility] on 10/1/24 onto a disc and have for upcoming appt with Dr. D'Amico on 10/9/24. He will get the psa as well, order placed.  Nothing else needed at this time.

## 2024-10-03 NOTE — TELEPHONE ENCOUNTER
Pt called and stated he was able to obtain the disc with results, have his lab work completed and connected his care everywhere through [facility]. Pt confirmed his 10/9 appointment.

## 2024-10-09 ENCOUNTER — TELEPHONE (OUTPATIENT)
Dept: UROLOGY | Facility: CLINIC | Age: 74
End: 2024-10-09

## 2024-10-09 NOTE — TELEPHONE ENCOUNTER
Spoke with pt, he did get an appt with [facility] and will have his kidney removed robotically there. Nothing else needed at this time.  
HTN (hypertension)    Osteoporosis